# Patient Record
Sex: MALE | Race: BLACK OR AFRICAN AMERICAN
[De-identification: names, ages, dates, MRNs, and addresses within clinical notes are randomized per-mention and may not be internally consistent; named-entity substitution may affect disease eponyms.]

---

## 2017-05-19 NOTE — HP
ADMISSION HISTORY AND PHYSICAL:

 

DATE OF SURGERY:  05/30/17 - [facility]



DATE OF CONSULTATION:  05/18/17.



ATTENDING SURGEON:  Damaso Evans MD (DICTATED BY CYNDI RO)

 

CHIEF COMPLAINT:  Right inguinal swelling and pain.

 

HISTORY OF PRESENT ILLNESS:   is a pleasant 26-year-old gentleman who 
presented to our office earlier in March for evaluation of right inguinal 
hernia. He reports that he had noticed swelling in the right groin area on and 
off for over a year now.  He was seen by his primary care physician 
approximately 6 months ago and was found on examination to have a right 
inguinal hernia for which he was initially referred to our office for 
consultation and possible hernia repair.  He notes that hernia had been present 
intermittently and causes no pain.  He has been able to reduce the hernia 
manually once or twice daily.  He denied any changes in the bowel habits, 
significant pain in the groin area or loss of appetite.  He returned to the 
office today for evaluation and to discuss hernia surgery to be performed by 
Dr. Evans.  Since he was seen here in the office 2 months ago he denies any 
significant changes.  He still notices bulge in the right groin that he is able 
to reduce.  He denies any scrotal swelling, difficulty with bowel movement or 
any other associated symptoms.  He otherwise is relatively healthy 26-year-old 
gentleman who has been diagnosed since childhood with mild mental handicap 
syndrome.

 

PAST MEDICAL HISTORY:  Significant for mild persistent exercises induced asthma
, essential hypertension, familial polycythemia vera, as well as acne.

 

PAST SURGICAL HISTORY:  He had tonsillectomy and adenoidectomy at childhood as 
well as an open heart surgery operation at age 3 that he described as been born 
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

 

CURRENT MEDICATIONS:  Include;

1.  Tylenol 650 mg every 4 hours as needed for pain.

2.  Advair Diskus 100/50 mcg per dose 1 puff by mouth b.i.d.

3.  Benicar 40/25 mg 1 tablet a day.

4.  Aspirin 81 mg once daily.

5.  ProAir  mcg 2 puffs 4 times a day as needed for shortness of breath.

6.  Cepastat 14.5 mg lozenges every 2 hours as needed for sore throat.

7.  Norvasc 5 mg 1 tablet daily.

 

ALLERGIES:  He has no known drug allergies.

 

SOCIAL HISTORY:  The patient is a nonsmoker.  Denies alcohol intake and 
caffeine intake is minimal.  He is active and athletic who works out in the 
[employer] on a daily basis and has been training for the special Olympics.

 

REVIEW OF SYSTEMS:  She HPI, otherwise negative.  He denies any headache, 
dizziness, blurred vision or syncope.  No chest pain, shortness of breath, cough
, or wheezing.  He denies back pain, abdominal pain, nausea,  vomiting, or 
changes in the bowel habits.  He admits to right inguinal hernia for the past 
year or so occasionally present and he has been able to reduce it once or twice 
daily, especially after workout.  Denies any urinary symptoms such as dysuria, 
hematuria, or urinary frequency.

 

                               PHYSICAL EXAMINATION

 

GENERAL:  He is a pleasant, healthy appearing young gentleman in no acute 
distress or discomfort.

 

VITAL SIGNS:  Revealed blood pressure of 132/82, pulse of 76, respiration of 18
, temperature of 97.5, O2 sat of 100% on room air.  He is 5 feet 5-1/2 inches, 
weight is 199 pounds with BMI of 32.

 

HEENT:  Sclerae anicteric.  PERRLA.  EOMs intact.  Oropharynx is pink, moist 
with no exudate.

 

NECK:  Supple.  Trachea midline.  No cervical adenopathy noted.  No thyromegaly.

 

LUNGS:  Clear to auscultation bilaterally.  No rales, wheezes, or rhonchi.

 

HEART:  Regular rate and rhythm.  Normal S1 and S2 without rubs, murmurs, or 
gallops.

 

BACK:  Normal curvature.  No CVA tenderness.

 

ABDOMEN:  Soft, nontender, and nondistended.  A large midline incision noted 
for prior surgery, well healed.  No evidence of incisional hernia.  Examination 
of the right inguinal hernia as the patient is standing revealed moderate size 
right inguinal hernia noted that was nontender on palpation.  There was no 
scrotal swelling or evidence of indirect hernia through the scrotum.  The 
patient was again examined in the supine position and we can appreciate moderate
-size defect in the right inguinal area, again nontender with no evidence of 
incarceration or strangulation.  He is a normal circumcised male.  No scrotal 
swelling.  No penile discharge noted.

 

EXTREMITIES:  Without cyanosis, clubbing, or edema.

 

NEUROLOGIC:  Grossly intact.

 

RECTAL:  Deferred at this time.

 

 IMPRESSION:  A 26-year-old gentleman with right inguinal hernia.

 

PLAN:  The patient is scheduled for an open right inguinal hernia repair by Dr. Evans on 05/30/17.  I went on and discussed with him the rationale and 
indications of surgery today.  The risks of surgery include, but not limited to 
infection, bleeding, or injury to adjacent structures.  He appears to 
understand and consented to go for surgery.  He was asked to hold his aspirin 
for 7 days prior to surgery.  Other information regarding the preoperative and 
immediate postoperative instructions were given to him with emphasis regarding 
weight lifting and exercises limitations.  He seems to understand and wishes to 
proceed as outlined.  He is also advised to take his blood pressure medicine in 
the morning of surgery with a sip of water and he will be seen by his primary 
care physician sometime this week to discuss about any need for medical 
clearance.  His polycythemia vera has been stable and his last therapeutic 
phlebotomy was done last summer.  There is no immediate contraindication for 
surgery at this time.  He will followup him up accordingly after his procedure.

 

 ____________________________________ CYNDI RO



CC:  Christiano Lima MD; Sha Branch MD *

 

[id number]/[facility] #: [id number]

ROSA M

## 2017-05-30 ENCOUNTER — HOSPITAL ENCOUNTER (OUTPATIENT)
Dept: HOSPITAL 25 - OR | Age: 27
Discharge: HOME | End: 2017-05-30
Attending: SURGERY
Payer: MEDICARE

## 2017-05-30 VITALS — SYSTOLIC BLOOD PRESSURE: 126 MMHG | DIASTOLIC BLOOD PRESSURE: 67 MMHG

## 2017-05-30 DIAGNOSIS — I10: ICD-10-CM

## 2017-05-30 DIAGNOSIS — K40.90: Primary | ICD-10-CM

## 2017-05-30 DIAGNOSIS — J45.990: ICD-10-CM

## 2017-05-30 DIAGNOSIS — D45: ICD-10-CM

## 2017-05-30 PROCEDURE — C1781 MESH (IMPLANTABLE): HCPCS

## 2017-05-30 NOTE — OP
OPERATIVE REPORT:

 

DATE OF OPERATION:  05/30/17 - [facility]

 

DATE OF BIRTH:  [birth year]

 

SURGEON:  Damaso Evans MD

 

ASSISTANT:  None.

 

ANESTHESIOLOGIST:  Dr. Dickson.

 

ANESTHESIA:  Local MAC.

 

PRE-OP DIAGNOSIS:  Right inguinal hernia.

 

POST-OP DIAGNOSIS:  Right inguinal hernia.

 

OPERATIVE PROCEDURE:  Open repair, right inguinal hernia with mesh.

 

ESTIMATED BLOOD LOSS:  Minimal.

 

IV FLUIDS:  Crystalloid.

 

SPECIMENS:  None.

 

DRAINS:  None.

 

COMPLICATIONS:  None.

 

COUNTS:  The instrument, needle, and sponge counts were correct.

 

DESCRIPTION OF PROCEDURE:  The patient was brought to the operating room and 
placed on the table supine.  Sequential compression devices were placed on both 
lower extremities.  Intravenous sedation was administered.  The right groin was 
prepped and draped in the usual sterile fashion.  Time-out was performed.

 

Local anesthetic was infiltrated as a field block in the right groin and an 
oblique incision was made approximately 7 cm long.  Subcutaneous tissues were 
divided with cautery.  Crossing veins were divided between clamps and ligated 
with 4 absorbable ties.  The external oblique aponeurosis was identified and 
additional anesthetic was infiltrated beneath this.  It was then opened along 
the line of its fibers through the superficial ring.  The aponeurosis was 
reflected back upon itself revealing the ilioinguinal nerve, which was 
identified, mobilized, and preserved in a lateral position.  The contents of 
the inguinal canal were isolated with a Penrose drain at the level of the pubic 
tubercle.  Subsequently, the patient was noted to have a large indirect 
inguinal hernia sac that was dissected free.  The sac was opened.  There was no 
sliding component.  The sac was twisted upon itself and it was suture ligated 
with 2-0 Polysorb.  The repair was then performed with the polypropylene mesh 
forming a cone-shaped plug, which was placed into the deep space and sutured to 
the overlying musculature with 2-0 Polysorb.  The Onlay patch was then attached 
and this was positioned into the groin to cover the floor.  It was sutured to 
the pubic tubercle, the shelving edge of the inguinal ligament and the conjoint 
tendon with interrupted 2-0 Polysorb.  The tail was cut in the mesh laterally 
to allow the egress of the spermatic cord and the mesh reconstituted laterally 
with a single 2-0 Surgipro suture.  The tails were tucked beneath the external 
oblique aponeurosis.  The ilioinguinal nerve was returned to its anatomic 
position and then the aponeurosis was run closed with a 2-0 Polysorb in a 
running fashion.  3-0 Polysorb was used to close the Ephraim's fascia in an 
interrupted fascia and then skin incisions were closed with 4-0 Monocryl in 
subcuticular fashion.  Steri-Strips were applied.  The patient tolerated the 
procedure well.  He was awakened and transferred to recovery in stable 
condition.

 

CC:  Reyna Lyman*

 

 [id number]/[facility] #: [id number]

MTDTAVARES

## 2018-07-10 ENCOUNTER — HOSPITAL ENCOUNTER (EMERGENCY)
Dept: HOSPITAL 25 - UCEAST | Age: 28
Discharge: HOME | End: 2018-07-10
Payer: MEDICARE

## 2018-07-10 VITALS — DIASTOLIC BLOOD PRESSURE: 57 MMHG | SYSTOLIC BLOOD PRESSURE: 119 MMHG

## 2018-07-10 DIAGNOSIS — I10: ICD-10-CM

## 2018-07-10 DIAGNOSIS — J45.909: ICD-10-CM

## 2018-07-10 DIAGNOSIS — R42: Primary | ICD-10-CM

## 2018-07-10 PROCEDURE — G0463 HOSPITAL OUTPT CLINIC VISIT: HCPCS

## 2018-07-10 PROCEDURE — 93005 ELECTROCARDIOGRAM TRACING: CPT

## 2018-07-10 PROCEDURE — 99211 OFF/OP EST MAY X REQ PHY/QHP: CPT

## 2018-07-10 NOTE — UC
Dizzy HPI


HPI Summary: 


WHILE HAVING A POLYGRAPH TEST DONE THIS MORNING PT SUDDENLY FELT DIZZY. LASTED 

ABOUT A MINUTE THEN SPONTANEOUSLY RESOLVED. PT DENIES CP, SOB, NAUSEA, VISUAL 

DISTURBANCES, SOTO. FEELS WELL AT BASELINE AT TIME OF EXAM. ADMITS HE HASN'T 

EATEN ANYTHING TODAY OR HAD MUCH TO DRINK OTHER THAN A FEW SIPS OF WATER WITH 

HIS 2 BP MEDS THIS MORNING.





- History Of Current Complaint


Chief Complaint: UCDizziness


Stated Complaint: DIZZY


Time Seen by Provider: 07/10/18 11:47


Hx Obtained From: Patient


Onset/Duration: Sudden Onset, Lasting Minutes, Resolved


Severity Initially: Moderate


Severity Currently: None


Pain Intensity: 0


Pain Scale Used: 0-10 Numeric


Character: Dizzy


Aggravating Factor(s): Nothing


Alleviating Factor(s): Other - SPONTANEOUS RESOLUTION


Associated Signs And Symptoms: Negative: Nausea, Tinnitus, Chest Pain, SOB, 

Palpitations, Visual Changes, Change In Medication





- Allergies/Home Medications


Allergies/Adverse Reactions: 


 Allergies











Allergy/AdvReac Type Severity Reaction Status Date / Time


 


No Known Allergies Allergy   Verified 07/10/18 11:48














PMH/Surg Hx/FS Hx/Imm Hx


Cardiovascular History: Hypertension


Respiratory History: Asthma





- Surgical History


Surgical History: Yes


Surgery Procedure, Year, and Place: surgery age 3 for congenital heart anomaly.

  T&A childhood





- Family History


Known Family History: Positive: Hypertension





- Social History


Alcohol Use: Occasionally


Alcohol Amount: 1 drink per week


Substance Use Type: None


Smoking Status (MU): Never Smoked Tobacco


Have You Smoked in the Last Year: No





Review of Systems


Constitutional: Negative


Respiratory: Negative


Cardiovascular: Negative


Gastrointestinal: Negative


Neurological: Other - DIZZINESS - NOW RESOLVED


All Other Systems Reviewed And Are Negative: Yes





Physical Exam


Triage Information Reviewed: Yes


Appearance: Well-Appearing, No Pain Distress, Well-Nourished


Vital Signs: 


 Initial Vital Signs











Temp  97.8 F   07/10/18 11:41


 


Pulse  90   07/10/18 11:41


 


Resp  16   07/10/18 11:41


 


BP  119/57   07/10/18 11:41


 


Pulse Ox  98   07/10/18 11:41











Vital Signs Reviewed: Yes


Eyes: Positive: Conjunctiva Clear


ENT: Positive: Hearing grossly normal, Pharynx normal, TMs normal


Neck: Positive: Supple, Nontender, No Lymphadenopathy


Respiratory Exam: Normal


Cardiovascular Exam: Normal


Abdomen Description: Positive: Soft


Musculoskeletal: Positive: No Edema


Neurological: Positive: Alert, Muscle Tone Normal


Psychological: Positive: Age Appropriate Behavior


Skin: Negative: rashes





Diagnostics





- EKG


Cardiac Rate: NL


Cardiac Rhythm: Sinus: Normal


Ectopy: None





Dizzy Course/Dx





- Course


Course Of Treatment: BRIEF EPISODE OF DIZZINESS LIKELY DUE TO ACUTE STRESSFUL 

SITUATION IN ADDITION TO THE FACT THAT HE TOOK HIS BLOOD PRESSURE MEDICATIONS 

ON AN EMPTY STOMACH AND HAS NOT HAD ANYTHING TO EAT/DRINK SINCE THEN.  WILL 

HAVE PATIENT CHECK HIS BLOOD PRESSURE DAILY FOR THE NEXT COUPLE OF WEEKS AND 

FOLLOW-UP WITH HIS PCP TO ENSURE THAT HE DOES NOT NEED HIS BP MEDS ADJUSTED.  

BLOOD PRESSURE IS LOW NORMAL HERE.  EKG UNREMARKABLE TODAY.  UNCHANGED FROM 

PREVIOUS.





- Differential Dx/Diagnosis


Provider Diagnoses: DIZZINESS - RESOLVED





Discharge





- Sign-Out/Discharge


Documenting (check all that apply): Discharge/Admit/Transfer





- Discharge Plan


Condition: Stable


Disposition: HOME


Patient Education Materials:  Dizziness (ED)


Referrals: 


Christiano Lima MD [Primary Care Provider] -  (FOLLOW-UP IN 2-4 WEEKS)


Additional Instructions: 


YOUR BRIEF EPISODE OF DIZZINESS MAY HAVE BEEN DUE TO THE ACUTE STRESSFUL 

SITUATION IN CONJUNCTION WITH TAKING YOUR BLOOD PRESSURE MEDICATIONS AND NOT 

HAVING ANY SIGNIFICANT FLUID/FOOD INTAKE YET TODAY.  YOUR PHYSICAL EXAM TODAY 

IS NORMAL.  BLOOD PRESSURE LOW NORMAL /57.





WOULD RECOMMEND CHECKING YOUR BLOOD PRESSURE ONCE DAILY IN THE MORNING FOR THE 

NEXT 2 WEEKS.  KEEP A LOG OF THIS INFORMATION AND FOLLOW-UP WITH DR. LIMA IN 

THE NEXT 2-4 WEEKS.  BRING YOUR BLOOD PRESSURE VALUES WITH YOU.  IT'S POSSIBLE 

THAT YOUR MEDICATION REGIMEN MAY NEED TO BE ADJUSTED.  ALSO BE SURE TO DRINK A 

FULL GLASS OF WATER IN THE MORNINGS WHEN YOU TAKE YOUR MEDICATION AND ENSURE 

ADEQUATE HYDRATION OVER THE COURSE OF THE DAY.





GO TO ER WITHOUT FAIL IF YOU DEVELOP RECURRENT DIZZINESS, SHORTNESS OF BREATH, 

CHEST PAIN, NAUSEA, SWEATS OR ANY OTHER CONCERNING SYMPTOMS.





- Billing Disposition and Condition


Condition: STABLE


Disposition: Home

## 2019-09-04 NOTE — HP
CC:  Dr. Lindsey Barton; Dr. Sha Branch*

 

HISTORY AND PHYSICAL:

 

DATE OF PLANNED ADMISSION AND SURGERY:  09/06/19

 

HISTORY OF PRESENT ILLNESS:  Mr. Reg Rizo is a 29-year-old -American 
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 
orchiectomy.  

The patient has noted progressive painless right scrotal enlargement over the 
last 4 months.  It was not been associated with any scrotal or inguinal pain.  
Recently, he noted some heaviness in the Rt hemiscrotum, and on self-
examination he noted enlarged right testis.  He consulted Dr. Barton, who 
ordered a scrotal ultrasound.  The study showed a 6 cm heterogeneous solid mass 
occupying most of the right testis.  The left testis looked normal.  The 
findings were consistent with a large right testicular tumor.

 

The patient was then referred to my office.  Workup of testicular tumor was 
done.  Blood work showed elevated AFP at 61 (normal less than 6) and moderate 
elevation of the HCG at 3.5 (1.4 is the upper limit of normal).  His LDH was 
also elevated.

 

The patient then had a CT of the chest, abdomen, and pelvis.  The study showed 
several intra-aortocaval lymph nodes that were enlarged measuring almost 3 cm 
in size.  There was no evidence of any pulmonary metastasis or other 
abnormalities.

 

The patient is now admitted for right radical orchiectomy.

 

PAST MEDICAL HISTORY AND SYSTEM REVIEW:  The patient is hypertensive, 
maintained on amlodipine 5 mg daily and on irbesartan/HCTZ 300 mg/12.5 mg 
daily.  

He has history of asthma and is on ProAir and on Advair as needed.  

The patient has a history of familial polycythemia vera and is followed by Dr. Branch and is maintained at this time on aspirin 81 mg daily.  He also goes for 
periodic phlebotomies.

 

PAST SURGICAL HISTORY:  Relevant for a right inguinal hernia repair done by Dr. Evans using mesh 2 years ago.

 

ALLERGIES:  He denies any allergies to medications.

 

PERSONAL HISTORY:  He is a nonsmoker.  He has a mild mental handicap and is a 
resident at the [facility].

 

                               PHYSICAL EXAMINATION

 

GENERAL:  A well-built, pleasant black male.

 

VITAL SIGNS:  Blood pressure 140/86, pulse of 80.

 

LUNGS:  Clear.  He has no gynecomastia.

 

HEART:  Regular and rhythmic, no murmurs.

 

ABDOMEN:  Normal.  He has no CVA tenderness.

 

GENITAL:  Exam shows a large firm mass occupying the whole right testis. No 
adherence to the scrotal wall. The left testis feels normal.  He has no 
inguinal hernias.  There is a right inguinal hernia scar, and mesh can be felt 
through the external inguinal ring..

 

EXTREMITIES:  No leg edema.

 

 IMPRESSION:

1.  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 
suspicious for metastatic disease with elevated testicular markers, especially 
elevated AFP.

2.  Status post right inguinal hernia repair with the use of mesh.

3.  Familial thrombocytosis.

4.  Hypertension, controlled on treatment.

5.  Asthma.

 

PLAN/RECOMMENDATIONS:  Plan is for right radical orchiectomy.  The right 
inguinal incision will have to be opened and the mesh may have to be partially 
dissected to allow the dissection of the spermatic cord.  I discussed the 
surgical plan with Dr. Evans, the general surgeon who performed the 
herniorrhaphy, and he will be available to assist in case there is more 
dissection needed at the site of the hernia repair.

 

I had a long discussion with the patient and the [facility] nurse accompanying 
him about the planned surgery.  They both understand that the patient will most 
likely require additional treatments based on the pathology.  Some of the 
potential complications of the surgery including infection, hematoma, and small 
incidence of recurrence of the inguinal hernia were also discussed.  All their 
questions were answered.

 

 

 

[id number]/[facility] #: [id number]

ROSA M

## 2019-09-06 ENCOUNTER — HOSPITAL ENCOUNTER (OUTPATIENT)
Dept: HOSPITAL 25 - OR | Age: 29
Discharge: HOME | End: 2019-09-06
Attending: UROLOGY
Payer: MEDICARE

## 2019-09-06 VITALS — DIASTOLIC BLOOD PRESSURE: 70 MMHG | SYSTOLIC BLOOD PRESSURE: 138 MMHG

## 2019-09-06 DIAGNOSIS — C62.91: Primary | ICD-10-CM

## 2019-09-06 DIAGNOSIS — D47.3: ICD-10-CM

## 2019-09-06 DIAGNOSIS — J45.909: ICD-10-CM

## 2019-09-06 DIAGNOSIS — G40.89: ICD-10-CM

## 2019-09-06 DIAGNOSIS — I10: ICD-10-CM

## 2019-09-06 PROCEDURE — 88333 PATH CONSLTJ SURG CYTO XM 1: CPT

## 2019-09-06 PROCEDURE — 88309 TISSUE EXAM BY PATHOLOGIST: CPT

## 2019-09-06 PROCEDURE — 88342 IMHCHEM/IMCYTCHM 1ST ANTB: CPT

## 2019-09-06 PROCEDURE — 88341 IMHCHEM/IMCYTCHM EA ADD ANTB: CPT

## 2019-09-06 NOTE — OP
CC:  Dr. Branch; Dr. Cardenas *

 

DATE OF OPERATION:  09/06/19 - [facility]

 

DATE OF BIRTH:  [birth year]

 

SURGEON:  Everton Desai MD

 

ASSISTANT:  Joni Almodovar RN, Surgical Assistant.

 

ANESTHESIOLOGIST:  Dr. Saldana.

 

ANESTHESIA:  General.

 

PRE-OP DIAGNOSES:

1.  Right testicular tumor.

2.  Status post right inguinal hernia repair with mesh.

 

POST-OP DIAGNOSES:

1.  Right testicular tumor.

2.  Status post right inguinal hernia repair with mesh.

 

OPERATIVE PROCEDURE:  Right radical orchiectomy.

 

INDICATIONS FOR PROCEDURE:  The patient is a 29-year-old male who noted 
progressive right testicular enlargement and firmness over the last 4 months.  
Scrotal ultrasound showed a 6 cm heterogeneous mass replacing the whole right 
testis.  AFP was markedly elevated.  CT scan of the abdomen and pelvis showed 
suspicious retroperitoneal lymph nodes.  

He had a right inguinal hernia repair 2 years ago with the use of mesh.

He is admitted today for right radical orchiectomy.

 

OPERATIVE FINDINGS:  Exam under anesthesia again confirmed a diffusely enlarged 
and firm right testis.  The left testis felt normal.  There were no adhesions 
between the right testicle and the scrotal skin.

 

Upon right inguinal exploration, there was a dense mesh all the way to the 
level of the external ring.

 

DESCRIPTION OF PROCEDURE:  After successful general anesthesia, the patient was 
prepped and draped for a right scrotal incision.  An incision was carried in 
the right inguinal area and was deepened through the subcutaneous tissue and 
the Ephraim's fascia.  The mesh was exposed.  The spermatic cord was then 
identified at its exit from the external ring and that level was 
circumferentially dissected and a Penrose drain was applied around it.  The 
spermatic cord was then dissected as proximal as possible considering its 
adherence to the mesh.  A Monisha clamp was applied over the spermatic cord at 
that level.

 

The right testicle was then delivered through the inguinal incision. The 
attachments of the testis to the scrotal wall were divided with cautery and the 
vessels were ligated with 2-0 Vicryl ties.  Additional proximal dissection of 
this spermatic cord was then carried as allowable by its adhesions to the mesh. 
The mesh was very dense, and it would have been technically difficult to safely 
dissect the spermatic cord from it.  

Because of the above, and considering the patient has likely metastatic disease 
with elevated AFP, and enlarged retroperitoneal lymph nodes on the CT scan, it 
was decided not to open the mesh to identify the cord for additional dissection 
to the level of the internal ring as customary with a radical orchiectomy.

 

The spermatic cord was then divided between Monisha clamps at its most proximal 
dissection level .  The specimen was removed and sent fresh for pathology.  The 
stump of the spermatic cord was then double ligated with suture ligatures of 2-
0 Vicryl and 2-0 silk ties.  Good hemostasis was achieved.

 

The incision was then irrigated with saline.  After making sure there was good 
hemostasis, a total of 16 cc of 0.5% Marcaine without epinephrine were used to 
infiltrate the incision for postoperative analgesia.  The incision was then 
closed using 2-0 Vicryl for the subcutaneous tissue and the Ephraim's fascia, 
and the skin was closed using running subcuticular suture of 4-0 Vicryl.  Steri-
Strips were applied.

 

The patient tolerated the procedure well and left the operating room in good 
condition.  

There was no blood loss.  All the counts were correct.  

The specimen was right testis and spermatic cord.

 

 [id number]/[facility] #: [id number]

ROSA M

## 2019-10-29 ENCOUNTER — HOSPITAL ENCOUNTER (EMERGENCY)
Dept: HOSPITAL 25 - ED | Age: 29
Discharge: HOME | End: 2019-10-29
Payer: MEDICARE

## 2019-10-29 VITALS — SYSTOLIC BLOOD PRESSURE: 141 MMHG | DIASTOLIC BLOOD PRESSURE: 74 MMHG

## 2019-10-29 DIAGNOSIS — C62.91: ICD-10-CM

## 2019-10-29 DIAGNOSIS — Z79.899: ICD-10-CM

## 2019-10-29 DIAGNOSIS — I10: ICD-10-CM

## 2019-10-29 DIAGNOSIS — T81.89XA: Primary | ICD-10-CM

## 2019-10-29 LAB
ALBUMIN SERPL BCG-MCNC: 4.5 G/DL (ref 3.2–5.2)
ALBUMIN/GLOB SERPL: 1.4 {RATIO} (ref 1–3)
ALP SERPL-CCNC: 81 U/L (ref 34–104)
ALT SERPL W P-5'-P-CCNC: 17 U/L (ref 7–52)
ANION GAP SERPL CALC-SCNC: 7 MMOL/L (ref 2–11)
AST SERPL-CCNC: 16 U/L (ref 13–39)
BASOPHILS # BLD AUTO: 0 10^3/UL (ref 0–0.2)
BUN SERPL-MCNC: 13 MG/DL (ref 6–24)
BUN/CREAT SERPL: 12.9 (ref 8–20)
CALCIUM SERPL-MCNC: 9.6 MG/DL (ref 8.6–10.3)
CHLORIDE SERPL-SCNC: 100 MMOL/L (ref 101–111)
EOSINOPHIL # BLD AUTO: 0 10^3/UL (ref 0–0.6)
GLOBULIN SER CALC-MCNC: 3.3 G/DL (ref 2–4)
GLUCOSE SERPL-MCNC: 87 MG/DL (ref 70–100)
HCO3 SERPL-SCNC: 28 MMOL/L (ref 22–32)
HCT VFR BLD AUTO: 45 % (ref 42–52)
HGB BLD-MCNC: 14.9 G/DL (ref 14–18)
INR PPP/BLD: 1.1 (ref 0.82–1.09)
LYMPHOCYTES # BLD AUTO: 1.1 10^3/UL (ref 1–4.8)
MCH RBC QN AUTO: 26 PG (ref 27–31)
MCHC RBC AUTO-ENTMCNC: 33 G/DL (ref 31–36)
MCV RBC AUTO: 78 FL (ref 80–94)
MONOCYTES # BLD AUTO: 0.3 10^3/UL (ref 0–0.8)
NEUTROPHILS # BLD AUTO: 1.8 10^3/UL (ref 1.5–7.7)
NRBC # BLD AUTO: 0 10^3/UL
NRBC BLD QL AUTO: 0.2
PLATELET # BLD AUTO: 358 10^3/UL (ref 150–450)
POTASSIUM SERPL-SCNC: 3.7 MMOL/L (ref 3.5–5)
PROT SERPL-MCNC: 7.8 G/DL (ref 6.4–8.9)
RBC # BLD AUTO: 5.75 10^6 /UL (ref 4.18–5.48)
SODIUM SERPL-SCNC: 135 MMOL/L (ref 135–145)
WBC # BLD AUTO: 3.3 10^3/UL (ref 3.5–10.8)

## 2019-10-29 PROCEDURE — 85025 COMPLETE CBC W/AUTO DIFF WBC: CPT

## 2019-10-29 PROCEDURE — 85652 RBC SED RATE AUTOMATED: CPT

## 2019-10-29 PROCEDURE — 36415 COLL VENOUS BLD VENIPUNCTURE: CPT

## 2019-10-29 PROCEDURE — 83605 ASSAY OF LACTIC ACID: CPT

## 2019-10-29 PROCEDURE — 86140 C-REACTIVE PROTEIN: CPT

## 2019-10-29 PROCEDURE — 87070 CULTURE OTHR SPECIMN AEROBIC: CPT

## 2019-10-29 PROCEDURE — 99283 EMERGENCY DEPT VISIT LOW MDM: CPT

## 2019-10-29 PROCEDURE — 87205 SMEAR GRAM STAIN: CPT

## 2019-10-29 PROCEDURE — 85610 PROTHROMBIN TIME: CPT

## 2019-10-29 PROCEDURE — 80053 COMPREHEN METABOLIC PANEL: CPT

## 2019-10-29 NOTE — ED
GI/ HPI





- HPI Summary


HPI Summary: 





Pt is a 30 y/o M presenting to the ED with a chief complaint of testicular 

issues. Per report from Dr. Knight at , the pt has stage III testicular 

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 Desai, and the 

wound is not healing. He was seen in the wound clinic yesterday and they were 

unable to do an ultrasound. He notes some drainage since the resection. No 

fevers. Has been packing wound. He denies pain. Dr. Branch is his oncologist, he 

has not started chemotherapy yet.





- History of Current Complaint


Chief Complaint: EDHipPelvisInjury


Time Seen by Provider: 10/29/19 10:30


Stated Complaint: WOUND CHECK AND ULTRA SOUND NEEDED


Hx Obtained From: Patient


Onset/Duration: Started Days Ago, Still Present


Timing: Constant, Lasting Days


Severity: Mild


Current Severity: None


Pain Intensity: 0


Additional Locations for Males: Testicles - right


Associated Signs and Symptoms: Positive: Other: - some drainage from wound


Aggravating Factor(s): Nothing


Alleviating Factor(s): Nothing





- Allergy/Home Medications


Allergies/Adverse Reactions: 


 Allergies











Allergy/AdvReac Type Severity Reaction Status Date / Time


 


No Known Allergies Allergy   Verified 09/06/19 07:20











Home Medications: 


 Home Medications





Acetaminophen TAB* [Tylenol TAB*] 650 mg PO Q4H PRN 10/29/19 [History Confirmed 

10/29/19]


Albuterol inh POWDER (NF) [Proair Respiclick] 2 puff INH QID PRN 10/29/19 [

History Confirmed 10/29/19]


Irbesartan-Hctz 300/12.5 1 tab PO QAM 10/29/19 [History Confirmed 10/29/19]











PMH/Surg Hx/FS Hx/Imm Hx


Previously Healthy: Yes


Endocrine/Hematology History: Reports: Hx Bone Marrow Disease - Polycythemia 

vera, stable


   Denies: Hx Diabetes


Cardiovascular History: Reports: Hx Hypertension - controlled with medication, 

Other Cardiovascular Problems/Disorders - congenital heart anomaly repaired at 

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


Respiratory History: Reports: Hx Asthma - since childhood, controlled with 

medication


GI History: Reports: Other GI Disorders - UMBILICAL HERNIA


 History: Reports: Other  Problems/Disorders - RIGHT TESTE MALIGNANT 

NEOPLASM


   Denies: Hx Renal Disease


Sensory History: Reports: Hx Contacts or Glasses - reading


   Denies: Hx Cataracts, Hx Glaucoma, Hx Hearing Aid


Opthamlomology History: Reports: Hx Contacts or Glasses - reading


   Denies: Hx Cataracts, Hx Glaucoma


Neurological History: Reports: Hx Seizures - last episode was in grade school, 

1997-98?, Other Neuro Impairments/Disorders - gran mal seizure hx, see above





- Surgical History


Surgery Procedure, Year, and Place: surgery age 3 for congenital heart anomaly.

  T&A childhood.  HERNIA SX


Hx Anesthesia Reactions: No


Infectious Disease History: No


Infectious Disease History: 


   Denies: History Other Infectious Disease, Traveled Outside the [place] in Last 30 

Days





- Family History


Known Family History: Positive: Hypertension





- Social History


Alcohol Use: Occasionally


Alcohol Amount: 1 drink per week


Hx Substance Use: No


Substance Use Type: Reports: None


Hx Tobacco Use: No


Smoking Status (MU): Never Smoked Tobacco


Have You Smoked in the Last Year: No





Review of Systems


Positive: other - testicular drainage


Negative: Myalgia - testicular pain


Positive: Other - testicular wound


All Other Systems Reviewed And Are Negative: Yes





Physical Exam





- Summary


Physical Exam Summary: 





Constitutional: Well-developed, Well-nourished, Alert. (-) Distressed


Skin: Warm, Dry. 3cm draining wound (serosanguinous) to R inguinal canal w/o 

surrounding erythema


HENT: Normocephalic; Atraumatic


Eyes: Conjunctiva normal


Neck: Musculoskeletal ROM normal neck. (-) JVD, (-) Stridor, (-) Nuchal rigidity


Cardio: Rhythm regular, rate normal, Heart sounds normal; Intact distal pulses; 

Radial pulses are 2+ and symmetric. (-) Murmur


Pulmonary/Chest wall: Effort normal. (-) Respiratory distress, (-) Wheezes, (-) 

Rales


Abd: Soft, (-) tenderness, (-) Distension, (-) Guarding, (-) Rebound


: Status post right orchiectomy


Musculoskeletal: (-) Edema


Neuro: Alert, Oriented x3


Psych: Mood and affect Normal








Triage Information Reviewed: Yes


Vital Signs On Initial Exam: 


 Initial Vitals











Temp Pulse Resp BP Pulse Ox


 


 97.6 F   73   18   155/77   99 


 


 10/29/19 10:26  10/29/19 10:26  10/29/19 10:26  10/29/19 10:26  10/29/19 10:26











Vital Signs Reviewed: Yes





Procedures





- Sedation


Patient Received Moderate/Deep Sedation with Procedure: No





Diagnostics





- Vital Signs


 Vital Signs











  Temp Pulse Resp BP Pulse Ox


 


 10/29/19 10:35      99


 


 10/29/19 10:26  97.6 F  73  18  155/77  99














- Laboratory


Result Diagrams: 


 10/29/19 10:48





 10/29/19 10:48


Lab Statement: Any lab studies that have been ordered have been reviewed, and 

results considered in the medical decision making process.





- Ultrasound


  ** Soft Tissue US


Ultrasound Interpretation Completed By: Radiologist


Summary of Ultrasound Findings: IN THE AREA OF CLINICAL ABNORMALITY, THERE ARE 

FINDINGS SUSPICIOUS FOR SINUS TRACT EXTENDING TO THE LEVEL OF THE ANTERIOR 

ABDOMINAL WALL MUSCULATURE.  ED physician has reviewed this report.





Re-Evaluation





- Re-Evaluation


  ** First Eval


Re-Evaluation Time: 12:50


Comment: updated patient on results of labs and ultrasound.  Wound was packed.  

Patient to follow-up with Dr. Knight and Dr. Desai outpatient





GIGU Course/Dx





- Course


Course Of Treatment: 30 y/o male w hx R testicular cancer now s/p R orchiectomy 

p/w draining wound.  CT on 10/17 showed post op changes in R inguinal area, 

interaortocaval LB.  PE here w draining wound to R inguinal canal. Will check 

labs and US





- Diagnoses


Provider Diagnoses: 


 Draining postoperative wound








Discharge ED





- Sign-Out/Discharge


Documenting (check all that apply): Patient Departure





- Discharge Plan


Condition: Stable


Disposition: HOME


Patient Education Materials:  Acute Wound Care (ED)


Referrals: 


Roxie Cardenas MD [Primary Care Provider] - 


Everton Desai MD [Medical Doctor] - 


Additional Instructions: 


You were seen in the emergency department for a wound.  Your ultrasound showed 

a small tract on the abdominal wall that is likely the cause of your drainage.  

Please continue to pack the wound with iodoform gauze.  We discussed this with 

our urologist Dr. Desai who agrees to follow up outpatient


If any studies were not completed at the time of discharge you will be called 

with the relevant results.


Please follow up with your primary care doctor in next 2-3 days and return to 

emergency department for worsening or concerning symptoms.


It was a pleasure taking care of you today.





- Billing Disposition and Condition


Condition: STABLE


Disposition: Home





- Attestation Statements


Document Initiated by Scribe: Yes


Documenting Scribe: Danitza Tran


Provider For Whom Scribe is Documenting (Include Credential): Carol Guy MD.


Scribe Attestation: 


I, Danitza Tran, scribed for Carol Guy MD. on 10/29/19 at 1301. 


Scribe Documentation Reviewed: Yes


Provider Attestation: 


The documentation as recorded by the scribe, Danitza Tran accurately 

reflects the service I personally performed and the decisions made by me, 

Carol Guy MD.


Status of Scribe Document: Viewed





Consult


Consult: 





[id number] - I spoke with Dr. Desai about the pt's present condition who requests 

that the wound be packed with iodoform gauze prior to discharge.

## 2019-10-30 ENCOUNTER — HOSPITAL ENCOUNTER (INPATIENT)
Dept: HOSPITAL 25 - MED | Age: 29
LOS: 9 days | Discharge: NURSING, REHAB OR LONG-TERM CARE | DRG: 920 | End: 2019-11-08
Attending: INTERNAL MEDICINE | Admitting: INTERNAL MEDICINE
Payer: MEDICARE

## 2019-10-30 DIAGNOSIS — C62.91: ICD-10-CM

## 2019-10-30 DIAGNOSIS — R59.9: ICD-10-CM

## 2019-10-30 DIAGNOSIS — Z79.82: ICD-10-CM

## 2019-10-30 DIAGNOSIS — I95.9: ICD-10-CM
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
## 2019-10-30 DIAGNOSIS — C62.90: ICD-10-CM

## 2019-10-30 DIAGNOSIS — D45: ICD-10-CM

## 2019-10-30 DIAGNOSIS — E66.9: ICD-10-CM

## 2019-10-30 DIAGNOSIS — R06.6: ICD-10-CM

## 2019-10-30 DIAGNOSIS — Z79.899: ICD-10-CM

## 2019-10-30 DIAGNOSIS — T81.49XA: ICD-10-CM

## 2019-10-30 DIAGNOSIS — J45.909: ICD-10-CM

## 2019-10-30 DIAGNOSIS — I10: ICD-10-CM

## 2019-10-30 DIAGNOSIS — T81.31XA: Primary | ICD-10-CM

## 2019-10-30 DIAGNOSIS — Z79.51: ICD-10-CM

## 2019-10-30 DIAGNOSIS — Z23: ICD-10-CM
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
## 2019-10-30 PROCEDURE — 99232 SBSQ HOSP IP/OBS MODERATE 35: CPT

## 2019-10-30 PROCEDURE — 87070 CULTURE OTHR SPECIMN AEROBIC: CPT

## 2019-10-30 PROCEDURE — G0463 HOSPITAL OUTPT CLINIC VISIT: HCPCS

## 2019-10-30 PROCEDURE — 86140 C-REACTIVE PROTEIN: CPT

## 2019-10-30 PROCEDURE — 70460 CT HEAD/BRAIN W/DYE: CPT

## 2019-10-30 PROCEDURE — 36415 COLL VENOUS BLD VENIPUNCTURE: CPT

## 2019-10-30 PROCEDURE — 85652 RBC SED RATE AUTOMATED: CPT

## 2019-10-30 PROCEDURE — 83605 ASSAY OF LACTIC ACID: CPT

## 2019-10-30 PROCEDURE — 87077 CULTURE AEROBIC IDENTIFY: CPT

## 2019-10-30 PROCEDURE — 99283 EMERGENCY DEPT VISIT LOW MDM: CPT

## 2019-10-30 PROCEDURE — 80048 BASIC METABOLIC PNL TOTAL CA: CPT

## 2019-10-30 PROCEDURE — 90732 PPSV23 VACC 2 YRS+ SUBQ/IM: CPT

## 2019-10-30 PROCEDURE — 80053 COMPREHEN METABOLIC PANEL: CPT

## 2019-10-30 PROCEDURE — 87205 SMEAR GRAM STAIN: CPT

## 2019-10-30 PROCEDURE — 85610 PROTHROMBIN TIME: CPT

## 2019-10-30 PROCEDURE — 94640 AIRWAY INHALATION TREATMENT: CPT

## 2019-10-30 PROCEDURE — 99222 1ST HOSP IP/OBS MODERATE 55: CPT

## 2019-10-30 PROCEDURE — 99214 OFFICE O/P EST MOD 30 MIN: CPT

## 2019-10-30 PROCEDURE — 85025 COMPLETE CBC W/AUTO DIFF WBC: CPT

## 2019-10-30 PROCEDURE — 83735 ASSAY OF MAGNESIUM: CPT

## 2019-10-30 PROCEDURE — 99233 SBSQ HOSP IP/OBS HIGH 50: CPT

## 2019-10-30 RX ADMIN — MOMETASONE FUROATE AND FORMOTEROL FUMARATE DIHYDRATE SCH: 100; 5 AEROSOL RESPIRATORY (INHALATION) at 19:01

## 2019-10-30 RX ADMIN — PIPERACILLIN AND TAZOBACTAM SCH MLS/HR: 3; .375 INJECTION, POWDER, LYOPHILIZED, FOR SOLUTION INTRAVENOUS; PARENTERAL at 21:18

## 2019-10-30 RX ADMIN — ENOXAPARIN SODIUM SCH MG: 40 INJECTION SUBCUTANEOUS at 17:06

## 2019-10-30 RX ADMIN — MOMETASONE FUROATE AND FORMOTEROL FUMARATE DIHYDRATE SCH PUFF: 100; 5 AEROSOL RESPIRATORY (INHALATION) at 18:48

## 2019-10-30 NOTE — CONS
CC:  [facility]; Primary Care Doctor; CYNDI Cheatham; Dr. Everton Desai *

 

SURGICAL CONSULTATION REPORT:

 

DATE OF CONSULT:  10/30/19

 

LOCATION:  Room 401.

 

HISTORY OF PRESENT ILLNESS:  I was asked by my partners to evaluate Mr. Reg Rizo, a 29-year-old gentleman with a new diagnosis of testicular cancer, 
namely seminoma, and orchiectomy done in September 2019.  This was done through 
a right groin incision and the patient's wound was primarily closed.  It opened 
up soon thereafter after the patient showered on postop day 1.  He presented 
back to his surgeon and then was referred to the wound center.  The wound 
center notes were reviewed as well as the patient was evaluated there.

 

The patient is 2-1/2 years status post open right inguinal hernia repair with 
mesh; this was a patch-and-plug for right inguinal hernia.  The patient has 
healed without incident after that procedure.

 

Now, the patient has a nonhealing surgical wound, for which he was admitted and 
started on IV antibiotics.

 

The patient's  states that he has had some significant drainage from 
the wound in the early stages, but in the last day this has settled down.  The 
patient denies any pain.  He describes pruritus at the site.  Originally there 
had been some foul-smelling drainage and these were cultured in the wound 
center on 10/28/19 and showed normal renee.

 

The patient also underwent an ultrasound of the site which was concerning for a 
sinus tract of the open wound to the underlying muscle.

 

Today, the patient states he is doing better with each passing day.  He denies 
any instability.  He has no pain and is urinating okay.

 

PAST MEDICAL HISTORY:  Reviewed and includes hypertension, asthma, polycythemia 
vera.

 

PAST SURGICAL HISTORY:  As above.

 

MEDICATIONS:  List reviewed.

 

ALLERGIES:  No allergies.

 

SOCIAL HISTORY:  He is a nonsmoker.  He lives in assisted living and presents 
with the .

 

REVIEW OF SYSTEMS:  No fevers or chills.  No shortness of breath or chest pain.
  No pain at the incision site.  No disability.  No dysuria.

 

PHYSICAL EXAM:  He is afebrile.  Vital signs are stable.  He is alert and 
oriented x3, in no apparent distress.  Focused examination of the wound reveals 
that he has a 1 x 4.5 cm open incision with a depth of about 1 cm.  Good 
granulation tissue with minimal prominence laterally approximately 0.8 cm and 
medially at 0.8 cm also. No exposed mesh.  The wound is granulating well 
without significant drainage. There is no surrounding cellulitis.

 

DIAGNOSTIC STUDIES/LAB DATA:  Labs reviewed and shows recent white count of 3.3
, H and H 14/42.  CRP is 2.85.

 

IMPRESSION AND RECOMMENDATIONS:  Surgical site infection, now with dehisced 
wound and good granulation tissue, on IV antibiotics at this time.  Concern is 
the possibility of infected mesh underlying it.  This is not apparent on today'
s evaluation and my recommendation is watchful waiting with a negative pressure 
wound therapy treatment at this time to assist in the healing so that the 
patient could be expedited towards his chemotherapy regimen.  The patient is 
aware of this plan and he prefers this.  I feel it is not recommended to excise 
the mesh under these circumstances with a well-healing wound.  We will watch 
closely.

 

The patient cannot go to his living situation with a negative pressure wound 
therapy according to the  who was with him today.  Nevertheless, I 
believe this is important for him going forward to treat his cancer with 
chemotherapy more urgently and for this reason, I would recommend a short-term 
nursing home admission with negative pressure wound therapy I think maybe for 
about 10 to 14 days.  We will follow closely.  No surgical intervention at this 
time. Negative pressure wound therapy and antibiotic IV converted to oral plus/
minus infectious disease consultation.

 

 [id number] #: [id number]

ROSA M

## 2019-10-31 LAB
ANION GAP SERPL CALC-SCNC: 6 MMOL/L (ref 2–11)
BASOPHILS # BLD AUTO: 0 10^3/UL (ref 0–0.2)
BUN SERPL-MCNC: 10 MG/DL (ref 6–24)
BUN/CREAT SERPL: 8.5 (ref 8–20)
CALCIUM SERPL-MCNC: 9.4 MG/DL (ref 8.6–10.3)
CHLORIDE SERPL-SCNC: 103 MMOL/L (ref 101–111)
EOSINOPHIL # BLD AUTO: 0.1 10^3/UL (ref 0–0.6)
GLUCOSE SERPL-MCNC: 107 MG/DL (ref 70–100)
HCO3 SERPL-SCNC: 30 MMOL/L (ref 22–32)
HCT VFR BLD AUTO: 46 % (ref 42–52)
HGB BLD-MCNC: 14.8 G/DL (ref 14–18)
LYMPHOCYTES # BLD AUTO: 0.8 10^3/UL (ref 1–4.8)
MCH RBC QN AUTO: 26 PG (ref 27–31)
MCHC RBC AUTO-ENTMCNC: 32 G/DL (ref 31–36)
MCV RBC AUTO: 80 FL (ref 80–94)
MONOCYTES # BLD AUTO: 0.1 10^3/UL (ref 0–0.8)
NEUTROPHILS # BLD AUTO: 1.5 10^3/UL (ref 1.5–7.7)
NRBC # BLD AUTO: 0 10^3/UL
NRBC BLD QL AUTO: 0.6
PLATELET # BLD AUTO: 322 10^3/UL (ref 150–450)
POTASSIUM SERPL-SCNC: 3.9 MMOL/L (ref 3.5–5)
RBC # BLD AUTO: 5.79 10^6 /UL (ref 4.18–5.48)
SODIUM SERPL-SCNC: 139 MMOL/L (ref 135–145)
WBC # BLD AUTO: 2.5 10^3/UL (ref 3.5–10.8)

## 2019-10-31 RX ADMIN — LOSARTAN POTASSIUM SCH MG: 25 TABLET, FILM COATED ORAL at 08:18

## 2019-10-31 RX ADMIN — ENOXAPARIN SODIUM SCH MG: 40 INJECTION SUBCUTANEOUS at 15:58

## 2019-10-31 RX ADMIN — SODIUM CHLORIDE, SODIUM LACTATE, POTASSIUM CHLORIDE, AND CALCIUM CHLORIDE SCH MLS/HR: 600; 310; 30; 20 INJECTION, SOLUTION INTRAVENOUS at 15:55

## 2019-10-31 RX ADMIN — PIPERACILLIN AND TAZOBACTAM SCH MLS/HR: 3; .375 INJECTION, POWDER, LYOPHILIZED, FOR SOLUTION INTRAVENOUS; PARENTERAL at 14:50

## 2019-10-31 RX ADMIN — AMLODIPINE BESYLATE SCH MG: 5 TABLET ORAL at 08:18

## 2019-10-31 RX ADMIN — HYDROCHLOROTHIAZIDE SCH MG: 25 TABLET ORAL at 08:18

## 2019-10-31 RX ADMIN — ASPIRIN SCH MG: 81 TABLET, COATED ORAL at 08:18

## 2019-10-31 RX ADMIN — PIPERACILLIN AND TAZOBACTAM SCH MLS/HR: 3; .375 INJECTION, POWDER, LYOPHILIZED, FOR SOLUTION INTRAVENOUS; PARENTERAL at 06:00

## 2019-10-31 RX ADMIN — MOMETASONE FUROATE AND FORMOTEROL FUMARATE DIHYDRATE SCH PUFF: 100; 5 AEROSOL RESPIRATORY (INHALATION) at 08:04

## 2019-10-31 RX ADMIN — MOMETASONE FUROATE AND FORMOTEROL FUMARATE DIHYDRATE SCH PUFF: 100; 5 AEROSOL RESPIRATORY (INHALATION) at 20:08

## 2019-10-31 RX ADMIN — PIPERACILLIN AND TAZOBACTAM SCH MLS/HR: 3; .375 INJECTION, POWDER, LYOPHILIZED, FOR SOLUTION INTRAVENOUS; PARENTERAL at 20:35

## 2019-10-31 NOTE — OP
DATE OF OPERATION:  10/31/19 - ROOM #401

 

DATE OF BIRTH:  [birth year]

 

SURGEON:  Tera Mccarthy MD

 

ASSISTANT:  None.

 

PRE-OP DIAGNOSIS:  Right groin infection after orchiectomy, possible mesh 
infection.

 

POST-OP DIAGNOSIS:  Right groin wound, no obvious exposed mesh, wound tracking 
down towards scrotum.

 

OPERATIVE PROCEDURE:  Right groin wound exploration, placement of wound VAC.

 

INDICATIONS FOR PROCEDURE:  The patient with testicular cancer, preparing to 
undergo chemotherapy with apparent active infection of the right groin wound 
after right orchiectomy.  He had a history of right groin hernia repair with 
mesh 2 years ago.  By report, mesh was exposed during the procedure.  He has 
not had closure of the wound from surgery dating back to early September of 2019.  Risks of wound exploration and mesh removal included, but not limited to 
bleeding, infection, injury to pelvic structures including nerves, vessels were 
explained to the patient, who seemed to understand and agreed to the procedure 
and all questions were answered.

 

DESCRIPTION OF PROCEDURE:  The patient was taken to the operating room, placed 
supine.  Preoperative antibiotics had been given.  After the successful 
induction of general endotracheal anesthesia, the right groin was prepped and 
draped in sterile fashion.  I explored the wound initially with my finger, 
which was known to track medially and inferiorly.  This extended well down to 
the groin externally towards the scrotum.  I could not feel any obvious source 
of infection such as exposed mesh or suture.  I opened the wound medially in 
order to achieve better exposure of the nonhealing area.  With retraction, I 
was able to see that this track down towards the scrotum; however, there was no 
obvious nidus of infection, no mesh was exposed.  There was granulation tissue 
on the superficial parts, but none down towards the groin.  There was no 
purulence and again no obvious source of infection.  This was irrigated 
copiously with saline, aspirated dry.  EBL minimal. Hemostasis was intact.  
Decision was made to place a wound VAC down into the entire open area, which 
was performed.  The wound VAC was then covered and sealed and connected to 
continuous suction.  The patient tolerated the procedure well.  He was 
extubated and taken to Recovery in stable condition.

 

 [id number]/CPS #: [id number]

ROSA M

## 2019-10-31 NOTE — PN
Progress Note





- Progress Note


Date of Service: 10/31/19


SOAP: 


Subjective:


feels ok.  wound still draining.  seen by Dr. Pardo last night who recommended 

a more conservative approach of IV antibiotics and negative pressure wound vac 

for hopeful healing in 10-14 days.








Objective:


 Vital Signs











Temp Pulse Resp BP Pulse Ox


 


 97.8 F   66   16   124/66   98 


 


 10/31/19 07:00  10/31/19 08:07  10/31/19 08:07  10/31/19 07:00  10/31/19 08:07








perr eomi


op moist


cta bl


s1 s2 nl


soft nt +bs


no le edema


4 cm open wound with serosanginous drainage, did not probe


A+O x 3, nonfocal neurological exam





 Laboratory Results - last 24 hr











  10/31/19 10/31/19





  08:42 08:42


 


WBC  2.5 L 


 


RBC  5.79 H 


 


Hgb  14.8 


 


Hct  46 


 


MCV  80 


 


MCH  26 L 


 


MCHC  32 


 


RDW  15 


 


Plt Count  322 


 


MPV  7.3 L 


 


Neut % (Auto)  58.6 


 


Lymph % (Auto)  34.1 


 


Mono % (Auto)  4.5 


 


Eos % (Auto)  2.0 


 


Baso % (Auto)  0.8 


 


Absolute Neuts (auto)  1.5 


 


Absolute Lymphs (auto)  0.8 L 


 


Absolute Monos (auto)  0.1 


 


Absolute Eos (auto)  0.1 


 


Absolute Basos (auto)  0.0 


 


Absolute Nucleated RBC  0.0 


 


Nucleated RBC %  0.6 


 


Sodium   139


 


Potassium   3.9


 


Chloride   103


 


Carbon Dioxide   30


 


Anion Gap   6


 


BUN   10


 


Creatinine   1.17


 


Est GFR ( Amer)   89.8


 


Est GFR (Non-Af Amer)   74.2


 


BUN/Creatinine Ratio   8.5


 


Glucose   107 H


 


Calcium   9.4














Acetaminophen (Tylenol Tab*)  650 mg PO Q4H PRN


   PRN Reason: PAIN-MODERATE/TEMP >/= 100.4


Albuterol (Ventolin Hfa Inhaler*)  2 puff INH QID PRN


   PRN Reason: SOB/WHEEZING


Amlodipine Besylate (Norvasc Tab*)  5 mg PO QAM [facility]


   Last Admin: 10/31/19 08:18 Dose:  5 mg


Aspirin (Aspirin Ec Tab*)  81 mg PO DAILY [facility]


   Last Admin: 10/31/19 08:18 Dose:  81 mg


Enoxaparin Sodium (Lovenox(*))  40 mg SUBCUT Q24H [facility]


   Last Admin: 10/30/19 17:06 Dose:  40 mg


Hydrochlorothiazide (Hydrodiuril Tab*)  12.5 mg PO QAM [facility]


   Last Admin: 10/31/19 08:18 Dose:  12.5 mg


Piperacillin Sod/Tazobactam (Sod 3.375 gm/ Sodium Chloride)  100 mls @ 25 mls/

hr IVPB Q8H [facility]


   Last Admin: 10/31/19 06:00 Dose:  25 mls/hr


Losartan Potassium (Cozaar Tab*)  100 mg PO QAM [facility]


   Last Admin: 10/31/19 08:18 Dose:  100 mg


Mometasone Furoate/Formoterol Fumar (Dulera 100/5 Mdi*)  2 puff INH BID [facility]


   Last Admin: 10/31/19 08:04 Dose:  2 puff


Pharmacy Consult (Zosyn Per Pharmacy*)  1 note FOLLOW UP .ZOSYN PER PHARMACY [facility]














Assessment:


28 yo M w recently diagnosed NONseminomatous testicular cancer (based on AFP) 

sp orchiectomy w course complicated by inguinal hernia wound infection and 

dehiscence.  In the interval of waiting for this to heal there has been MARKDED 

progression of his retroperitoneal adenopathy.  He would have ideally started 

chemotherapy several weeks ago and I am very concerned that waiting another 2 

weeks to allow this to hopefully heal with antibiotics and wound vac will 

compromise his health from a cancer standpoint.  I have asked Dr. Mccarthy, who 

previously reviewed the case with ' primary oncologist, Dr. Branch, to 

weigh in on whether surgery would facilitate a faster path to life saving 

chemotherapy. 





Plan:


-cont IV abx


-re-evaluation by surgery today

## 2019-11-01 LAB
ALBUMIN SERPL BCG-MCNC: 3.8 G/DL (ref 3.2–5.2)
ALBUMIN/GLOB SERPL: 1.3 {RATIO} (ref 1–3)
ALP SERPL-CCNC: 73 U/L (ref 34–104)
ALT SERPL W P-5'-P-CCNC: 20 U/L (ref 7–52)
ANION GAP SERPL CALC-SCNC: 8 MMOL/L (ref 2–11)
AST SERPL-CCNC: 18 U/L (ref 13–39)
BASOPHILS # BLD AUTO: 0 10^3/UL (ref 0–0.2)
BUN SERPL-MCNC: 14 MG/DL (ref 6–24)
BUN/CREAT SERPL: 13.7 (ref 8–20)
CALCIUM SERPL-MCNC: 9.5 MG/DL (ref 8.6–10.3)
CHLORIDE SERPL-SCNC: 103 MMOL/L (ref 101–111)
EOSINOPHIL # BLD AUTO: 0 10^3/UL (ref 0–0.6)
GLOBULIN SER CALC-MCNC: 2.9 G/DL (ref 2–4)
GLUCOSE SERPL-MCNC: 99 MG/DL (ref 70–100)
HCO3 SERPL-SCNC: 25 MMOL/L (ref 22–32)
HCT VFR BLD AUTO: 44 % (ref 42–52)
HGB BLD-MCNC: 14.3 G/DL (ref 14–18)
LYMPHOCYTES # BLD AUTO: 0.7 10^3/UL (ref 1–4.8)
MCH RBC QN AUTO: 26 PG (ref 27–31)
MCHC RBC AUTO-ENTMCNC: 32 G/DL (ref 31–36)
MCV RBC AUTO: 79 FL (ref 80–94)
MONOCYTES # BLD AUTO: 0.3 10^3/UL (ref 0–0.8)
NEUTROPHILS # BLD AUTO: 7.8 10^3/UL (ref 1.5–7.7)
NRBC # BLD AUTO: 0 10^3/UL
NRBC BLD QL AUTO: 0
PLATELET # BLD AUTO: 375 10^3/UL (ref 150–450)
POTASSIUM SERPL-SCNC: 4.9 MMOL/L (ref 3.5–5)
PROT SERPL-MCNC: 6.7 G/DL (ref 6.4–8.9)
RBC # BLD AUTO: 5.62 10^6 /UL (ref 4.18–5.48)
SODIUM SERPL-SCNC: 136 MMOL/L (ref 135–145)
WBC # BLD AUTO: 8.7 10^3/UL (ref 3.5–10.8)

## 2019-11-01 PROCEDURE — 3E10X8Z IRRIGATION OF SKIN AND MUCOUS MEMBRANES USING IRRIGATING SUBSTANCE: ICD-10-PCS | Performed by: SURGERY

## 2019-11-01 PROCEDURE — 2W16X6Z COMPRESSION OF RIGHT INGUINAL REGION USING PRESSURE DRESSING: ICD-10-PCS | Performed by: SURGERY

## 2019-11-01 RX ADMIN — PIPERACILLIN AND TAZOBACTAM SCH MLS/HR: 3; .375 INJECTION, POWDER, LYOPHILIZED, FOR SOLUTION INTRAVENOUS; PARENTERAL at 12:24

## 2019-11-01 RX ADMIN — MOMETASONE FUROATE AND FORMOTEROL FUMARATE DIHYDRATE SCH: 100; 5 AEROSOL RESPIRATORY (INHALATION) at 20:28

## 2019-11-01 RX ADMIN — ENOXAPARIN SODIUM SCH MG: 40 INJECTION SUBCUTANEOUS at 16:21

## 2019-11-01 RX ADMIN — PIPERACILLIN AND TAZOBACTAM SCH MLS/HR: 3; .375 INJECTION, POWDER, LYOPHILIZED, FOR SOLUTION INTRAVENOUS; PARENTERAL at 20:28

## 2019-11-01 RX ADMIN — HYDROCHLOROTHIAZIDE SCH MG: 25 TABLET ORAL at 08:53

## 2019-11-01 RX ADMIN — ASPIRIN SCH MG: 81 TABLET, COATED ORAL at 08:52

## 2019-11-01 RX ADMIN — PIPERACILLIN AND TAZOBACTAM SCH MLS/HR: 3; .375 INJECTION, POWDER, LYOPHILIZED, FOR SOLUTION INTRAVENOUS; PARENTERAL at 06:40

## 2019-11-01 RX ADMIN — AMLODIPINE BESYLATE SCH MG: 5 TABLET ORAL at 08:52

## 2019-11-01 RX ADMIN — SODIUM CHLORIDE, SODIUM LACTATE, POTASSIUM CHLORIDE, AND CALCIUM CHLORIDE SCH MLS/HR: 600; 310; 30; 20 INJECTION, SOLUTION INTRAVENOUS at 00:27

## 2019-11-01 RX ADMIN — SODIUM CHLORIDE, SODIUM LACTATE, POTASSIUM CHLORIDE, AND CALCIUM CHLORIDE SCH MLS/HR: 600; 310; 30; 20 INJECTION, SOLUTION INTRAVENOUS at 19:05

## 2019-11-01 RX ADMIN — LOSARTAN POTASSIUM SCH MG: 25 TABLET, FILM COATED ORAL at 08:53

## 2019-11-01 RX ADMIN — MOMETASONE FUROATE AND FORMOTEROL FUMARATE DIHYDRATE SCH PUFF: 100; 5 AEROSOL RESPIRATORY (INHALATION) at 08:21

## 2019-11-01 RX ADMIN — SODIUM CHLORIDE, SODIUM LACTATE, POTASSIUM CHLORIDE, AND CALCIUM CHLORIDE SCH MLS/HR: 600; 310; 30; 20 INJECTION, SOLUTION INTRAVENOUS at 09:50

## 2019-11-01 NOTE — PN
Progress Note





- Progress Note


Date of Service: 11/01/19


SOAP: 


Subjective:


[]Did fine with surgery yesterday. No in pain today. eating well. no new 

complaints. Cannot go back to residence with wound vac. 








Acetaminophen (Tylenol Tab*)  650 mg PO Q4H PRN


   PRN Reason: PAIN-MODERATE/TEMP >/= 100.4


Albuterol (Ventolin Hfa Inhaler*)  2 puff INH QID PRN


   PRN Reason: SOB/WHEEZING


Amlodipine Besylate (Norvasc Tab*)  5 mg PO QAM [facility]


   Last Admin: 10/31/19 08:18 Dose:  5 mg


Aspirin (Aspirin Ec Tab*)  81 mg PO DAILY [facility]


   Last Admin: 10/31/19 08:18 Dose:  81 mg


Enoxaparin Sodium (Lovenox(*))  40 mg SUBCUT Q24H [facility]


   Last Admin: 10/31/19 15:58 Dose:  40 mg


Hydrochlorothiazide (Hydrodiuril Tab*)  12.5 mg PO QAM [facility]


   Last Admin: 10/31/19 08:18 Dose:  12.5 mg


Piperacillin Sod/Tazobactam (Sod 3.375 gm/ Sodium Chloride)  100 mls @ 25 mls/

hr IVPB Q8H [facility]


   Last Admin: 11/01/19 06:40 Dose:  25 mls/hr


Lactated Ringer's (Lactated Ringers 1000 Ml Bag*)  1,000 mls @ 125 mls/hr IV 

PER RATE [facility]


   Last Admin: 11/01/19 00:27 Dose:  125 mls/hr


Losartan Potassium (Cozaar Tab*)  100 mg PO QAM [facility]


   Last Admin: 10/31/19 08:18 Dose:  100 mg


Mometasone Furoate/Formoterol Fumar (Dulera 100/5 Mdi*)  2 puff INH BID [facility]


   Last Admin: 10/31/19 20:08 Dose:  2 puff


Pharmacy Consult (Zosyn Per Pharmacy*)  1 note FOLLOW UP .ZOSYN PER PHARMACY [facility]








Objective:


[]


 Vital Signs











Temp Pulse Resp BP Pulse Ox


 


 98.2 F   81   22   117/51   99 


 


 11/01/19 03:25  11/01/19 03:25  11/01/19 03:25  11/01/19 03:25  11/01/19 03:25








HEENT: om moist, no lesions


CTA


RRR S1S2


+BS mild obese


wound vac in place, bloody drainage


no JUAN C








Assessment:


[]29 year old new diagnosis of testicular ca, non seminoma.  He has stage III, 

node positive disease. Course complicated by wound infection, non healing. Had 

surgery yesterday and mesh no effected. Has wound vac and expect slow healing. 

He will need to proceed with chemotherapy for testicular cancer. He has tashia 

disease, no visceral metastatic disease, low AFP giving him favorable disease, 

he can recieve 4 cycles of EP with GFS. Would like to start next week if 

possible.








Plan:


[]1. Wound vac in place, discussed placement with DS planning. Ideal will be NH 

and chemotherapy should be carve out. Will need transportation. 


2. Given delay re-check tumor markers and check MRI brain;


3. Follow over weekend, can treat on Monday in house if needed.

## 2019-11-01 NOTE — CONS
CONSULTATION REPORT:

 

DATE OF CONSULT:  11/01/19

 

REQUESTING PHYSICIAN:  Dr. Branch.

 

CONSULTING SERVICE:  Infectious Disease.

 

REASON FOR CONSULTATION:  Right inguinal wound.

 

IMPRESSION:

1.  Status post orchiectomy on 09/06/19 followed by reopening of the wound, 
which had been cultured on 10/14/19 and 10/16/19, both time growing citrobacter 
that was pansensitive.  Wound culture on 10/28/19 and 10/29/19 showed normal 
renee.  He does have a history of right inguinal hernia repair with mesh, was 
taken to the operating room by Dr. Mccarthy yesterday for exploration to see if 
the wound tracked down to the mesh.  It was not felt to and so was felt to go 
into the scrotum.  He has a wound VAC now.  There is no abscess in the pelvis 
or scrotum on CT.

2.  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 
upcoming chemotherapy.

3.  Obesity.

 

RECOMMENDATIONS:

1.  Continue Zosyn while he is here to cover citrobacter as well as various 
other possible gram negatives and anaerobes.  I do think with the need for 
upcoming chemotherapy and anticipated neutropenia, he should be maintained on 
oral antibiotics when he is done here and that could be in the form of 
Augmentin 875 mg by mouth twice a day and at this point, I would say the 
duration is indefinite pending his course.

2.  Wound VAC management per Surgery.

 

HISTORY OF PRESENT ILLNESS:  This is a 29-year-old man recently diagnosed with 
right testicular cancer, so far treated with orchiectomy early September, which 
he tolerated well and then said that the incision opened up a couple days 
postoperatively when he was in a shower.  Wound cultures taken on 10/14/19 grew 
citrobacter.  He had been on antibiotics as an outpatient without any change in 
the wound or the serosanguineous drainage he had noticed over time.  He had not 
had a time of fever, chills, or sweats and otherwise felt okay.  He does have 
retroperitoneal adenopathy on his CT imaging and chemotherapy is felt to be 
urgent. Today, he has no fevers or sweats.  He feels well.  He has a VAC 
dressing in right groin after having the wound explored yesterday.  He has been 
on Zosyn since he came here on 

10/ 3019.  His white count on admission was 2.5, it is 8.7 now.  He had a CT 
last done on 10/17/19 which showed no abscess.

 

PAST MEDICAL HISTORY:

1.  Testicular cancer, status post orchiectomy.

2.  History of right inguinal hernia repair with mesh.

3.  Obesity.

4.  Hypertension.

 

MEDICATIONS:

1.  Tylenol.

2.  Albuterol as needed.

3.  Amlodipine.

4.  Aspirin.

5.  Hydrochlorothiazide.

6.  Losartan 100 mg daily.

7.  Zosyn.

 

ALLERGIES:  No known drug allergies.

 

FAMILY HISTORY:  No recurrent infections.

 

SOCIAL HISTORY:  He lives at [facility] in [place].  He has no travel or sick 
contacts.

 

REVIEW OF SYSTEMS:  All negative except as noted above to a 12-point review.

 

PHYSICAL EXAM:  Vital Signs:  Temperature 36.5, heart rate 88, respiratory rate 
14, blood pressure 150/56, oxygen saturation 100% on room air.  General:  He is 
awake, not in distress.  Neurologic:  He is oriented x3, follows all commands.  
HEENT: There is no conjunctival hemorrhage.  Oropharynx:  Without lesions.  
Neck is supple without mass.  Heart is regular rate and rhythm without murmurs, 
rubs, or gallops. Lungs are clear to auscultation bilaterally.  Abdomen:  Soft, 
nontender, nondistended.  There are bowel sounds present.  Skin:  There is no 
rash or splinter hemorrhage.  Genitourinary:  Right inguinal adjacent to 
scrotum incision with a VAC dressing present.  No surrounding erythema or 
tenderness.  Musculoskeletal:  There is no spine tenderness to palpation. 
DIAGNOSTIC STUDIES/LAB DATA:  White blood cell count 8, hemoglobin 14.3, 
platelets 375.  Creatinine 1.

 

Please see impressions and recommendations outlined above.

 

Thanks for asking me to see Mr. Reg Rizo in consultation.

 

 [id number] #: [id number]

ROSA M

## 2019-11-01 NOTE — CONS
*** AMENDED REPORT NOW INCLUDES DATE OF CONSULT ***



CONSULTATION REPORT:



DATE OF CONSULT:  11/01/19

 

HISTORY:  Mr. Reg Rizo is a 29-year-old male who underwent right radical 
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  He developed partial skin 
separation of his incision and there was a concern about infection involving 
mesh that was placed at the time of his right inguinal hernia repair 2 years 
ago.

 

I received a consultation note of Dr. Sadiq Pardo.  In his note, Dr. Pardo 
mentioned that the patient noted that his incision opened up on postoperative 
day #1.  This is not what I have noted on my followup visits.  I had operated 
the patient on 09/06/19.  I saw him for follow-up postoperative visit on 10/03/
19, and the incision looked very well healed, there was no separation, no 
infection.  



His care givers brought him back to my office on 10/16/19. At that time, they 
had just noted a superficial separation of his incision.  On examination at 
that time, the separation was only subcutaneous and there was serous drainage, 
but again no signs of infection and no cellulitis noted.  A wound culture was 
obtained.  I packed the wound with iodoform and recommended daily dressing 
changes and gentle packing with iodoform by the facility or visiting nurse.  He 
was placed on a course of Keflex.  On his followup visit to my office on 10/24/
19, the incision seemed to have opened even deeper into the deep subcutaneous 
tissue.  There was again noted some serous drainage and it looked slightly 
purulent.  Again, I irrigated the incision with Betadine solution and packed 
him with Iodoform packing.  Because the healing was slower than expected, and 
because of the presence of the mesh from his inguinal herniorrhalphy, he was 
then referred to the wound clinic for management.  



The note is to give a better history about the onset of the skin separation of 
the incision, which occurred 4 weeks after the surgery and not on day post-op 
day 1, as reported to Dr. Pardo.



Thank you

 

ORIGINALLY ESIGNED 11/02/19 1048



 [id number]/CPS #: [id number]

ROSA M

## 2019-11-01 NOTE — PN
Progress Note





- Progress Note


Date of Service: 11/01/19


SOAP: 


Subjective: Comfortable in bed in NAD.  + Flatus, + BM, no N/V


[]








Objective:


 Vital Signs











Temp  97.4 F   11/01/19 08:00


 


Pulse  111   11/01/19 08:23


 


Resp  16   11/01/19 08:23


 


BP  136/62   11/01/19 08:00


 


Pulse Ox  95   11/01/19 08:23








 Intake & Output











 10/31/19 11/01/19 11/01/19





 18:59 06:59 18:59


 


Intake Total 2010 145 


 


Balance 2010 145 


 


Intake:   


 


  IV Fluids 1030 45 


 


    LR 1000  


 


    NS (0.9%) 30 45 


 


  IVPB 100 100 


 


    ABX - ZOSYN 100  


 


    NS (0.9%)  100 


 


  Oral 880 0 


 


Other:   


 


  Estimated Void  Large 


 


  # Bowel Movements  0 


 


  # Voids  0 








PEX:


Gen:       NAD


Chest:    CTA


CVS:       RRR


R Groin: Vac Dressing C/D/I


Ext:        calves non tender





[]








Assessment:28 yo male S/P Right Orchiectomy,  Stage III testicular cancer, non 

seminoma, node positive disease. and subsequent wound infection.  Prior Right 

Inguinal Hernia Repair with mesh.  


[]








Plan:  POD 1 S/P Right Groin Wound exploration, Irrigation, and placement of 

Vac Dressing by Dr Mccarthy 10/31/19.  Pt doing well, plan on vac dressing change 

over the weekend by Dr Evans.  Supplies left at bedside.


[]

## 2019-11-02 LAB
ALBUMIN SERPL BCG-MCNC: 3.6 G/DL (ref 3.2–5.2)
ALBUMIN/GLOB SERPL: 1.4 {RATIO} (ref 1–3)
ALP SERPL-CCNC: 55 U/L (ref 34–104)
ALT SERPL W P-5'-P-CCNC: 16 U/L (ref 7–52)
ANION GAP SERPL CALC-SCNC: 5 MMOL/L (ref 2–11)
AST SERPL-CCNC: 11 U/L (ref 13–39)
BASOPHILS # BLD AUTO: 0 10^3/UL (ref 0–0.2)
BUN SERPL-MCNC: 7 MG/DL (ref 6–24)
BUN/CREAT SERPL: 6.9 (ref 8–20)
CALCIUM SERPL-MCNC: 9 MG/DL (ref 8.6–10.3)
CHLORIDE SERPL-SCNC: 105 MMOL/L (ref 101–111)
EOSINOPHIL # BLD AUTO: 0 10^3/UL (ref 0–0.6)
GLOBULIN SER CALC-MCNC: 2.6 G/DL (ref 2–4)
GLUCOSE SERPL-MCNC: 84 MG/DL (ref 70–100)
HCO3 SERPL-SCNC: 29 MMOL/L (ref 22–32)
HCT VFR BLD AUTO: 42 % (ref 42–52)
HGB BLD-MCNC: 13.7 G/DL (ref 14–18)
LYMPHOCYTES # BLD AUTO: 2.1 10^3/UL (ref 1–4.8)
MCH RBC QN AUTO: 26 PG (ref 27–31)
MCHC RBC AUTO-ENTMCNC: 33 G/DL (ref 31–36)
MCV RBC AUTO: 79 FL (ref 80–94)
MONOCYTES # BLD AUTO: 0.4 10^3/UL (ref 0–0.8)
NEUTROPHILS # BLD AUTO: 3.5 10^3/UL (ref 1.5–7.7)
NRBC # BLD AUTO: 0 10^3/UL
NRBC BLD QL AUTO: 0.1
PLATELET # BLD AUTO: 341 10^3/UL (ref 150–450)
POTASSIUM SERPL-SCNC: 3.9 MMOL/L (ref 3.5–5)
PROT SERPL-MCNC: 6.2 G/DL (ref 6.4–8.9)
RBC # BLD AUTO: 5.33 10^6 /UL (ref 4.18–5.48)
SODIUM SERPL-SCNC: 139 MMOL/L (ref 135–145)
WBC # BLD AUTO: 6 10^3/UL (ref 3.5–10.8)

## 2019-11-02 RX ADMIN — MOMETASONE FUROATE AND FORMOTEROL FUMARATE DIHYDRATE SCH PUFF: 100; 5 AEROSOL RESPIRATORY (INHALATION) at 07:46

## 2019-11-02 RX ADMIN — MOMETASONE FUROATE AND FORMOTEROL FUMARATE DIHYDRATE SCH PUFF: 100; 5 AEROSOL RESPIRATORY (INHALATION) at 22:42

## 2019-11-02 RX ADMIN — PIPERACILLIN AND TAZOBACTAM SCH MLS/HR: 3; .375 INJECTION, POWDER, LYOPHILIZED, FOR SOLUTION INTRAVENOUS; PARENTERAL at 13:02

## 2019-11-02 RX ADMIN — LOSARTAN POTASSIUM SCH MG: 25 TABLET, FILM COATED ORAL at 09:30

## 2019-11-02 RX ADMIN — SODIUM CHLORIDE, SODIUM LACTATE, POTASSIUM CHLORIDE, AND CALCIUM CHLORIDE SCH MLS/HR: 600; 310; 30; 20 INJECTION, SOLUTION INTRAVENOUS at 23:40

## 2019-11-02 RX ADMIN — HYDROCHLOROTHIAZIDE SCH MG: 25 TABLET ORAL at 09:30

## 2019-11-02 RX ADMIN — SODIUM CHLORIDE, SODIUM LACTATE, POTASSIUM CHLORIDE, AND CALCIUM CHLORIDE SCH MLS/HR: 600; 310; 30; 20 INJECTION, SOLUTION INTRAVENOUS at 04:56

## 2019-11-02 RX ADMIN — PIPERACILLIN AND TAZOBACTAM SCH MLS/HR: 3; .375 INJECTION, POWDER, LYOPHILIZED, FOR SOLUTION INTRAVENOUS; PARENTERAL at 05:42

## 2019-11-02 RX ADMIN — ENOXAPARIN SODIUM SCH MG: 40 INJECTION SUBCUTANEOUS at 17:14

## 2019-11-02 RX ADMIN — ASPIRIN SCH MG: 81 TABLET, COATED ORAL at 09:32

## 2019-11-02 RX ADMIN — PIPERACILLIN AND TAZOBACTAM SCH MLS/HR: 3; .375 INJECTION, POWDER, LYOPHILIZED, FOR SOLUTION INTRAVENOUS; PARENTERAL at 21:23

## 2019-11-02 RX ADMIN — SODIUM CHLORIDE, SODIUM LACTATE, POTASSIUM CHLORIDE, AND CALCIUM CHLORIDE SCH MLS/HR: 600; 310; 30; 20 INJECTION, SOLUTION INTRAVENOUS at 12:57

## 2019-11-02 RX ADMIN — AMLODIPINE BESYLATE SCH MG: 5 TABLET ORAL at 09:30

## 2019-11-02 NOTE — PN
Progress Note





- Progress Note


Date of Service: 11/02/19


SOAP: 


Subjective:


[Feels good.  No fevers.  Tolerating wound vac well.]








Objective:


[


Vital Signs:











Temp Pulse Resp BP Pulse Ox


 


 98.4 F   71   16   117/51   100 


 


 11/02/19 12:00  11/02/19 12:00  11/02/19 12:00  11/02/19 12:00  11/02/19 12:00

















Acetaminophen (Tylenol Tab*)  650 mg PO Q4H PRN


   PRN Reason: PAIN-MODERATE/TEMP >/= 100.4


Albuterol (Ventolin Hfa Inhaler*)  2 puff INH QID PRN


   PRN Reason: SOB/WHEEZING


Amlodipine Besylate (Norvasc Tab*)  5 mg PO QAM Replaced by [facility]


   Last Admin: 11/02/19 09:30 Dose:  5 mg


Aspirin (Aspirin Ec Tab*)  81 mg PO DAILY Replaced by [facility]


   Last Admin: 11/02/19 09:32 Dose:  81 mg


Enoxaparin Sodium (Lovenox(*))  40 mg SUBCUT Q24H Replaced by [facility]


   Last Admin: 11/01/19 16:21 Dose:  40 mg


Hydrochlorothiazide (Hydrodiuril Tab*)  12.5 mg PO QAM Replaced by [facility]


   Last Admin: 11/02/19 09:30 Dose:  12.5 mg


Piperacillin Sod/Tazobactam (Sod 3.375 gm/ Sodium Chloride)  100 mls @ 25 mls/

hr IVPB Q8H Replaced by [facility]


   Last Admin: 11/02/19 13:02 Dose:  25 mls/hr


Lactated Ringer's (Lactated Ringers 1000 Ml Bag*)  1,000 mls @ 125 mls/hr IV 

PER RATE Replaced by [facility]


   Last Admin: 11/02/19 12:57 Dose:  125 mls/hr


Losartan Potassium (Cozaar Tab*)  100 mg PO QAM Replaced by [facility]


   Last Admin: 11/02/19 09:30 Dose:  100 mg


Mometasone Furoate/Formoterol Fumar (Dulera 100/5 Mdi*)  2 puff INH BID Replaced by [facility]


   Last Admin: 11/02/19 07:46 Dose:  2 puff


Pharmacy Consult (Zosyn Per Pharmacy*)  1 note FOLLOW UP .ZOSYN PER PHARMACY Replaced by [facility]








 Laboratory Results - last 24 hr











  11/02/19 11/02/19





  05:18 05:18


 


WBC  6.0 


 


RBC  5.33 


 


Hgb  13.7 L 


 


Hct  42 


 


MCV  79 L 


 


MCH  26 L 


 


MCHC  33 


 


RDW  16 H 


 


Plt Count  341 


 


MPV  7.7 


 


Neut % (Auto)  58.3 


 


Lymph % (Auto)  34.9 


 


Mono % (Auto)  6.3 


 


Eos % (Auto)  0.2 


 


Baso % (Auto)  0.3 


 


Absolute Neuts (auto)  3.5 


 


Absolute Lymphs (auto)  2.1 


 


Absolute Monos (auto)  0.4 


 


Absolute Eos (auto)  0.0 


 


Absolute Basos (auto)  0.0 


 


Absolute Nucleated RBC  0.0 


 


Nucleated RBC %  0.1 


 


Sodium   139


 


Potassium   3.9


 


Chloride   105


 


Carbon Dioxide   29


 


Anion Gap   5


 


BUN   7


 


Creatinine   1.02


 


Est GFR ( Amer)   105.2


 


Est GFR (Non-Af Amer)   87.0


 


BUN/Creatinine Ratio   6.9 L


 


Glucose   84


 


Calcium   9.0


 


Total Bilirubin   0.30


 


AST   11 L


 


ALT   16


 


Alkaline Phosphatase   55


 


Total Protein   6.2 L


 


Albumin   3.6


 


Globulin   2.6


 


Albumin/Globulin Ratio   1.4








Exam:


Gen: well appearing 30 yo male in NAD


HEENT: MMM


CV: RRR, no m/r/g


Resp: CTA, no w/c/r


Abd: soft, nonTTP, wound vac in place in L inguinal region


Ext: no edema]








Assessment:


[29 year old male with a new diagnosis of testicular ca, non seminoma.  He has 

stage III, node positive disease. Course complicated by wound infection, non 

healing. He was admitted for IV abx and surgical exploration of the wound which 

was done.  The wound does not tunnel to underlying mesh from prior hernia 

repair.  Wound vac in place.





Plan:


[1. R inguinal wound


- Wound vac in place, management per surgical team


- cont Zosyn and transition to Augmentin at discharge with plans to continue 

throughout chemotherapy and/or until wound is completely healed per ID 

recommendations


2. Testicular CA


- plan EP with Neulasta support x 4 cycles to start ASAP, likely start Monday 

depending on discharge plan





Dispo: discussed [facility] for wound vac management v home with his mother.  Patient 

prefers [facility].  


]

## 2019-11-03 LAB
ALBUMIN SERPL BCG-MCNC: 3.8 G/DL (ref 3.2–5.2)
ALBUMIN/GLOB SERPL: 1.4 {RATIO} (ref 1–3)
ALP SERPL-CCNC: 58 U/L (ref 34–104)
ALT SERPL W P-5'-P-CCNC: 15 U/L (ref 7–52)
ANION GAP SERPL CALC-SCNC: 6 MMOL/L (ref 2–11)
AST SERPL-CCNC: 10 U/L (ref 13–39)
BASOPHILS # BLD AUTO: 0 10^3/UL (ref 0–0.2)
BUN SERPL-MCNC: 7 MG/DL (ref 6–24)
BUN/CREAT SERPL: 6.9 (ref 8–20)
CALCIUM SERPL-MCNC: 9.4 MG/DL (ref 8.6–10.3)
CHLORIDE SERPL-SCNC: 100 MMOL/L (ref 101–111)
EOSINOPHIL # BLD AUTO: 0.1 10^3/UL (ref 0–0.6)
GLOBULIN SER CALC-MCNC: 2.8 G/DL (ref 2–4)
GLUCOSE SERPL-MCNC: 81 MG/DL (ref 70–100)
HCO3 SERPL-SCNC: 30 MMOL/L (ref 22–32)
HCT VFR BLD AUTO: 45 % (ref 42–52)
HGB BLD-MCNC: 14.5 G/DL (ref 14–18)
LYMPHOCYTES # BLD AUTO: 1.4 10^3/UL (ref 1–4.8)
MCH RBC QN AUTO: 25 PG (ref 27–31)
MCHC RBC AUTO-ENTMCNC: 32 G/DL (ref 31–36)
MCV RBC AUTO: 79 FL (ref 80–94)
MONOCYTES # BLD AUTO: 0.5 10^3/UL (ref 0–0.8)
NEUTROPHILS # BLD AUTO: 3.7 10^3/UL (ref 1.5–7.7)
NRBC # BLD AUTO: 0 10^3/UL
NRBC BLD QL AUTO: 0.1
PLATELET # BLD AUTO: 309 10^3/UL (ref 150–450)
POTASSIUM SERPL-SCNC: 3.9 MMOL/L (ref 3.5–5)
PROT SERPL-MCNC: 6.6 G/DL (ref 6.4–8.9)
RBC # BLD AUTO: 5.77 10^6 /UL (ref 4.18–5.48)
SODIUM SERPL-SCNC: 136 MMOL/L (ref 135–145)
WBC # BLD AUTO: 5.7 10^3/UL (ref 3.5–10.8)

## 2019-11-03 RX ADMIN — HYDROCHLOROTHIAZIDE SCH MG: 25 TABLET ORAL at 08:07

## 2019-11-03 RX ADMIN — SODIUM CHLORIDE, SODIUM LACTATE, POTASSIUM CHLORIDE, AND CALCIUM CHLORIDE SCH MLS/HR: 600; 310; 30; 20 INJECTION, SOLUTION INTRAVENOUS at 06:55

## 2019-11-03 RX ADMIN — MOMETASONE FUROATE AND FORMOTEROL FUMARATE DIHYDRATE SCH PUFF: 100; 5 AEROSOL RESPIRATORY (INHALATION) at 20:02

## 2019-11-03 RX ADMIN — PIPERACILLIN AND TAZOBACTAM SCH MLS/HR: 3; .375 INJECTION, POWDER, LYOPHILIZED, FOR SOLUTION INTRAVENOUS; PARENTERAL at 21:02

## 2019-11-03 RX ADMIN — PIPERACILLIN AND TAZOBACTAM SCH MLS/HR: 3; .375 INJECTION, POWDER, LYOPHILIZED, FOR SOLUTION INTRAVENOUS; PARENTERAL at 05:21

## 2019-11-03 RX ADMIN — LOSARTAN POTASSIUM SCH MG: 25 TABLET, FILM COATED ORAL at 08:08

## 2019-11-03 RX ADMIN — MOMETASONE FUROATE AND FORMOTEROL FUMARATE DIHYDRATE SCH PUFF: 100; 5 AEROSOL RESPIRATORY (INHALATION) at 07:50

## 2019-11-03 RX ADMIN — PIPERACILLIN AND TAZOBACTAM SCH MLS/HR: 3; .375 INJECTION, POWDER, LYOPHILIZED, FOR SOLUTION INTRAVENOUS; PARENTERAL at 14:44

## 2019-11-03 RX ADMIN — AMLODIPINE BESYLATE SCH MG: 5 TABLET ORAL at 08:07

## 2019-11-03 RX ADMIN — ASPIRIN SCH MG: 81 TABLET, COATED ORAL at 08:07

## 2019-11-03 RX ADMIN — SODIUM CHLORIDE, SODIUM LACTATE, POTASSIUM CHLORIDE, AND CALCIUM CHLORIDE SCH MLS/HR: 600; 310; 30; 20 INJECTION, SOLUTION INTRAVENOUS at 19:33

## 2019-11-03 RX ADMIN — ENOXAPARIN SODIUM SCH MG: 40 INJECTION SUBCUTANEOUS at 18:01

## 2019-11-03 NOTE — PN
Progress Note





- Progress Note


Date of Service: 11/03/19


SOAP: 


Subjective:


[Reports he's doing well.  No acute complaints.  Tolerating wound vac well]








Objective:


[


Vital Signs:











Temp Pulse Resp BP Pulse Ox


 


 98.4 F   89   18   109/55   97 


 


 11/03/19 07:48  11/03/19 07:48  11/03/19 08:00  11/03/19 07:48  11/03/19 08:00














Acetaminophen (Tylenol Tab*)  650 mg PO Q4H PRN


   PRN Reason: PAIN-MODERATE/TEMP >/= 100.4


Albuterol (Ventolin Hfa Inhaler*)  2 puff INH QID PRN


   PRN Reason: SOB/WHEEZING


Amlodipine Besylate (Norvasc Tab*)  5 mg PO QAM [facility]


   Last Admin: 11/03/19 08:07 Dose:  5 mg


Aspirin (Aspirin Ec Tab*)  81 mg PO DAILY [facility]


   Last Admin: 11/03/19 08:07 Dose:  81 mg


Enoxaparin Sodium (Lovenox(*))  40 mg SUBCUT Q24H [facility]


   Last Admin: 11/02/19 17:14 Dose:  40 mg


Hydrochlorothiazide (Hydrodiuril Tab*)  12.5 mg PO QAM [facility]


   Last Admin: 11/03/19 08:07 Dose:  12.5 mg


Piperacillin Sod/Tazobactam (Sod 3.375 gm/ Sodium Chloride)  100 mls @ 25 mls/

hr IVPB Q8H [facility]


   Last Admin: 11/03/19 05:21 Dose:  25 mls/hr


Lactated Ringer's (Lactated Ringers 1000 Ml Bag*)  1,000 mls @ 125 mls/hr IV 

PER RATE [facility]


   Last Admin: 11/03/19 06:55 Dose:  125 mls/hr


Losartan Potassium (Cozaar Tab*)  100 mg PO QAM [facility]


   Last Admin: 11/03/19 08:08 Dose:  100 mg


Mometasone Furoate/Formoterol Fumar (Dulera 100/5 Mdi*)  2 puff INH BID [facility]


   Last Admin: 11/03/19 07:50 Dose:  2 puff


Pharmacy Consult (Zosyn Per Pharmacy*)  1 note FOLLOW UP .ZOSYN PER PHARMACY [facility]





 Laboratory Results - last 24 hr











  11/03/19 11/03/19





  05:20 05:20


 


WBC  5.7 


 


RBC  5.77 H 


 


Hgb  14.5 


 


Hct  45 


 


MCV  79 L 


 


MCH  25 L 


 


MCHC  32 


 


RDW  16 H 


 


Plt Count  309 


 


MPV  7.4 


 


Neut % (Auto)  65.3 


 


Lymph % (Auto)  24.9 


 


Mono % (Auto)  8.1 


 


Eos % (Auto)  0.9 


 


Baso % (Auto)  0.8 


 


Absolute Neuts (auto)  3.7 


 


Absolute Lymphs (auto)  1.4 


 


Absolute Monos (auto)  0.5 


 


Absolute Eos (auto)  0.1 


 


Absolute Basos (auto)  0.0 


 


Absolute Nucleated RBC  0.0 


 


Nucleated RBC %  0.1 


 


Sodium   136


 


Potassium   3.9


 


Chloride   100 L


 


Carbon Dioxide   30


 


Anion Gap   6


 


BUN   7


 


Creatinine   1.01


 


Est GFR ( Amer)   106.4


 


Est GFR (Non-Af Amer)   88.0


 


BUN/Creatinine Ratio   6.9 L


 


Glucose   81


 


Calcium   9.4


 


Total Bilirubin   0.40


 


AST   10 L


 


ALT   15


 


Alkaline Phosphatase   58


 


Total Protein   6.6


 


Albumin   3.8


 


Globulin   2.8


 


Albumin/Globulin Ratio   1.4








Exam:


Gen: well appearing 28 yo male in NAD


HEENT: MMM


CV: RRR, no m/r/g


Resp: CTA, no w/c/r


Abd: soft, nonTTP, wound vac in place in R inguinal region


Ext: no edema]








Assessment:


[29 year old male with a new diagnosis of testicular ca, non seminoma.  He has 

stage III, node positive disease. Course complicated by wound infection, non 

healing. He was admitted for IV abx and surgical exploration of the wound which 

was done.  The wound does not tunnel to underlying mesh from prior hernia 

repair.  Wound vac in place.





Plan:


[1. R inguinal wound


- Wound vac in place, management per surgical team


- cont Zosyn and transition to Augmentin at discharge with plans to continue 

throughout chemotherapy and/or until wound is completely healed per ID 

recommendations


2. Testicular CA


- plan EP with Neulasta support x 4 cycles to start ASAP, likely start Monday 

depending on discharge plan





Dispo: discussed [facility] for wound vac management v home with his mother.  Patient 

prefers [facility].

## 2019-11-04 LAB — MAGNESIUM SERPL-MCNC: 1.7 MG/DL (ref 1.9–2.7)

## 2019-11-04 RX ADMIN — CISPLATIN SCH MLS/HR: 1 INJECTION, SOLUTION INTRAVENOUS at 16:35

## 2019-11-04 RX ADMIN — SODIUM CHLORIDE, SODIUM LACTATE, POTASSIUM CHLORIDE, AND CALCIUM CHLORIDE SCH MLS/HR: 600; 310; 30; 20 INJECTION, SOLUTION INTRAVENOUS at 20:01

## 2019-11-04 RX ADMIN — Medication SCH ML: at 19:11

## 2019-11-04 RX ADMIN — SODIUM CHLORIDE, SODIUM LACTATE, POTASSIUM CHLORIDE, AND CALCIUM CHLORIDE SCH MLS/HR: 600; 310; 30; 20 INJECTION, SOLUTION INTRAVENOUS at 14:42

## 2019-11-04 RX ADMIN — ENOXAPARIN SODIUM SCH MG: 40 INJECTION SUBCUTANEOUS at 19:10

## 2019-11-04 RX ADMIN — MOMETASONE FUROATE AND FORMOTEROL FUMARATE DIHYDRATE SCH PUFF: 100; 5 AEROSOL RESPIRATORY (INHALATION) at 20:10

## 2019-11-04 RX ADMIN — LOSARTAN POTASSIUM SCH MG: 25 TABLET, FILM COATED ORAL at 09:26

## 2019-11-04 RX ADMIN — HYDROCHLOROTHIAZIDE SCH MG: 25 TABLET ORAL at 09:27

## 2019-11-04 RX ADMIN — AMLODIPINE BESYLATE SCH MG: 5 TABLET ORAL at 09:27

## 2019-11-04 RX ADMIN — ASPIRIN SCH MG: 81 TABLET, COATED ORAL at 09:27

## 2019-11-04 RX ADMIN — ETOPOSIDE SCH MLS/HR: 20 INJECTION INTRAVENOUS at 15:30

## 2019-11-04 RX ADMIN — PIPERACILLIN AND TAZOBACTAM SCH MLS/HR: 3; .375 INJECTION, POWDER, LYOPHILIZED, FOR SOLUTION INTRAVENOUS; PARENTERAL at 05:29

## 2019-11-04 RX ADMIN — OLANZAPINE SCH MG: 10 TABLET ORAL at 19:53

## 2019-11-04 RX ADMIN — PIPERACILLIN AND TAZOBACTAM SCH MLS/HR: 3; .375 INJECTION, POWDER, LYOPHILIZED, FOR SOLUTION INTRAVENOUS; PARENTERAL at 19:53

## 2019-11-04 RX ADMIN — MOMETASONE FUROATE AND FORMOTEROL FUMARATE DIHYDRATE SCH PUFF: 100; 5 AEROSOL RESPIRATORY (INHALATION) at 08:24

## 2019-11-04 RX ADMIN — PIPERACILLIN AND TAZOBACTAM SCH MLS/HR: 3; .375 INJECTION, POWDER, LYOPHILIZED, FOR SOLUTION INTRAVENOUS; PARENTERAL at 11:55

## 2019-11-04 RX ADMIN — SODIUM CHLORIDE, SODIUM LACTATE, POTASSIUM CHLORIDE, AND CALCIUM CHLORIDE SCH MLS/HR: 600; 310; 30; 20 INJECTION, SOLUTION INTRAVENOUS at 05:29

## 2019-11-04 NOTE — PN
Progress Note





- Progress Note


Date of Service: 11/04/19


SOAP: 


Subjective:


[Feeling well this morning.  No complaints.  Wound vac dressing was exchanged 

by Dr Evans yesterday.  ]








Objective:


[


Vital Signs:











Temp Pulse Resp BP Pulse Ox


 


 98.4 F   82   18   128/69   97 


 


 11/04/19 08:00  11/04/19 08:26  11/04/19 08:26  11/04/19 08:00  11/04/19 08:26

















Acetaminophen (Tylenol Tab*)  650 mg PO Q4H PRN


   PRN Reason: PAIN-MODERATE/TEMP >/= 100.4


   Last Admin: 11/03/19 14:44 Dose:  650 mg


Albuterol (Ventolin Hfa Inhaler*)  2 puff INH QID PRN


   PRN Reason: SOB/WHEEZING


Amlodipine Besylate (Norvasc Tab*)  5 mg PO QAM [facility]


   Last Admin: 11/04/19 09:27 Dose:  5 mg


Aspirin (Aspirin Ec Tab*)  81 mg PO DAILY [facility]


   Last Admin: 11/04/19 09:27 Dose:  81 mg


Enoxaparin Sodium (Lovenox(*))  40 mg SUBCUT Q24H [facility]


   Last Admin: 11/03/19 18:01 Dose:  40 mg


Hydrochlorothiazide (Hydrodiuril Tab*)  12.5 mg PO QAM [facility]


   Last Admin: 11/04/19 09:27 Dose:  12.5 mg


Piperacillin Sod/Tazobactam (Sod 3.375 gm/ Sodium Chloride)  100 mls @ 25 mls/

hr IVPB Q8H [facility]


   Last Admin: 11/04/19 11:55 Dose:  25 mls/hr


Lactated Ringer's (Lactated Ringers 1000 Ml Bag*)  1,000 mls @ 125 mls/hr IV 

PER RATE [facility]


   Last Admin: 11/04/19 05:29 Dose:  125 mls/hr


Losartan Potassium (Cozaar Tab*)  100 mg PO QAM [facility]


   Last Admin: 11/04/19 09:26 Dose:  100 mg


Mometasone Furoate/Formoterol Fumar (Dulera 100/5 Mdi*)  2 puff INH BID [facility]


   Last Admin: 11/04/19 08:24 Dose:  2 puff


Pharmacy Consult (Zosyn Per Pharmacy*)  1 note FOLLOW UP .ZOSYN PER PHARMACY [facility]





Exam:


Gen: well appearing 28 yo male in NAD


HEENT: MMM


CV: RRR, no m/r/g


Resp: CTA, no w/c/r


Abd: soft, nonTTP, wound vac in place in R inguinal region


Ext: no edema]








Assessment:


[29 year old male with a new diagnosis of testicular ca, non seminoma.  He has 

stage III, node positive disease. Course complicated by wound infection, non 

healing. He was admitted for IV abx and surgical exploration of the wound which 

was done.  The wound does not tunnel to underlying mesh from prior hernia 

repair.  Wound vac in place.





Plan:


[1. R inguinal wound


- Wound vac in place, management per surgical team


- cont Zosyn and transition to Augmentin at discharge with plans to continue 

throughout chemotherapy and/or until wound is completely healed per ID 

recommendations


2. Testicular CA


- plan EP with Neulasta support x 4 cycles to start today:


cisplatin 20 mg/m2 IV D1-5


etoposide 100 mg/m2 IV D1-5 


- start olanzapine 10 mg po qhs D1-5 


- zofran/compazine prn nausea





Dispo: plan for dc to [facility] for wound vac management.

## 2019-11-05 LAB
ALBUMIN SERPL BCG-MCNC: 3.8 G/DL (ref 3.2–5.2)
ALBUMIN/GLOB SERPL: 1.3 {RATIO} (ref 1–3)
ALP SERPL-CCNC: 64 U/L (ref 34–104)
ALT SERPL W P-5'-P-CCNC: 52 U/L (ref 7–52)
ANION GAP SERPL CALC-SCNC: 5 MMOL/L (ref 2–11)
AST SERPL-CCNC: 30 U/L (ref 13–39)
BASOPHILS # BLD AUTO: 0 10^3/UL (ref 0–0.2)
BUN SERPL-MCNC: 15 MG/DL (ref 6–24)
BUN/CREAT SERPL: 14.3 (ref 8–20)
CALCIUM SERPL-MCNC: 9.2 MG/DL (ref 8.6–10.3)
CHLORIDE SERPL-SCNC: 104 MMOL/L (ref 101–111)
EOSINOPHIL # BLD AUTO: 0 10^3/UL (ref 0–0.6)
GLOBULIN SER CALC-MCNC: 2.9 G/DL (ref 2–4)
GLUCOSE SERPL-MCNC: 115 MG/DL (ref 70–100)
HCO3 SERPL-SCNC: 26 MMOL/L (ref 22–32)
HCT VFR BLD AUTO: 43 % (ref 42–52)
HGB BLD-MCNC: 13.7 G/DL (ref 14–18)
LYMPHOCYTES # BLD AUTO: 0.4 10^3/UL (ref 1–4.8)
MAGNESIUM SERPL-MCNC: 1.9 MG/DL (ref 1.9–2.7)
MCH RBC QN AUTO: 25 PG (ref 27–31)
MCHC RBC AUTO-ENTMCNC: 32 G/DL (ref 31–36)
MCV RBC AUTO: 79 FL (ref 80–94)
MONOCYTES # BLD AUTO: 0.1 10^3/UL (ref 0–0.8)
NEUTROPHILS # BLD AUTO: 4 10^3/UL (ref 1.5–7.7)
NRBC # BLD AUTO: 0 10^3/UL
NRBC BLD QL AUTO: 0
PLATELET # BLD AUTO: 337 10^3/UL (ref 150–450)
POTASSIUM SERPL-SCNC: 4.8 MMOL/L (ref 3.5–5)
PROT SERPL-MCNC: 6.7 G/DL (ref 6.4–8.9)
RBC # BLD AUTO: 5.4 10^6 /UL (ref 4.18–5.48)
SODIUM SERPL-SCNC: 135 MMOL/L (ref 135–145)
WBC # BLD AUTO: 4.5 10^3/UL (ref 3.5–10.8)

## 2019-11-05 PROCEDURE — 3E03305 INTRODUCTION OF OTHER ANTINEOPLASTIC INTO PERIPHERAL VEIN, PERCUTANEOUS APPROACH: ICD-10-PCS | Performed by: INTERNAL MEDICINE

## 2019-11-05 RX ADMIN — ASPIRIN SCH MG: 81 TABLET, COATED ORAL at 09:10

## 2019-11-05 RX ADMIN — AMLODIPINE BESYLATE SCH: 5 TABLET ORAL at 10:12

## 2019-11-05 RX ADMIN — ENOXAPARIN SODIUM SCH MG: 40 INJECTION SUBCUTANEOUS at 16:49

## 2019-11-05 RX ADMIN — CISPLATIN SCH MLS/HR: 1 INJECTION, SOLUTION INTRAVENOUS at 15:39

## 2019-11-05 RX ADMIN — SODIUM CHLORIDE, SODIUM LACTATE, POTASSIUM CHLORIDE, AND CALCIUM CHLORIDE SCH MLS/HR: 600; 310; 30; 20 INJECTION, SOLUTION INTRAVENOUS at 13:55

## 2019-11-05 RX ADMIN — PIPERACILLIN AND TAZOBACTAM SCH MLS/HR: 3; .375 INJECTION, POWDER, LYOPHILIZED, FOR SOLUTION INTRAVENOUS; PARENTERAL at 04:40

## 2019-11-05 RX ADMIN — Medication SCH ML: at 04:40

## 2019-11-05 RX ADMIN — DEXAMETHASONE SODIUM PHOSPHATE SCH MLS/HR: 4 INJECTION, SOLUTION INTRAMUSCULAR; INTRAVENOUS at 13:52

## 2019-11-05 RX ADMIN — PIPERACILLIN AND TAZOBACTAM SCH MLS/HR: 3; .375 INJECTION, POWDER, LYOPHILIZED, FOR SOLUTION INTRAVENOUS; PARENTERAL at 21:12

## 2019-11-05 RX ADMIN — MOMETASONE FUROATE AND FORMOTEROL FUMARATE DIHYDRATE SCH PUFF: 100; 5 AEROSOL RESPIRATORY (INHALATION) at 19:26

## 2019-11-05 RX ADMIN — MOMETASONE FUROATE AND FORMOTEROL FUMARATE DIHYDRATE SCH PUFF: 100; 5 AEROSOL RESPIRATORY (INHALATION) at 07:48

## 2019-11-05 RX ADMIN — ETOPOSIDE SCH MLS/HR: 20 INJECTION INTRAVENOUS at 14:24

## 2019-11-05 RX ADMIN — Medication SCH: at 17:38

## 2019-11-05 RX ADMIN — PIPERACILLIN AND TAZOBACTAM SCH MLS/HR: 3; .375 INJECTION, POWDER, LYOPHILIZED, FOR SOLUTION INTRAVENOUS; PARENTERAL at 12:14

## 2019-11-05 RX ADMIN — LOSARTAN POTASSIUM SCH: 25 TABLET, FILM COATED ORAL at 10:13

## 2019-11-05 RX ADMIN — HYDROCHLOROTHIAZIDE SCH: 25 TABLET ORAL at 10:12

## 2019-11-05 RX ADMIN — OLANZAPINE SCH MG: 10 TABLET ORAL at 21:11

## 2019-11-05 NOTE — PN
Progress Note





- Progress Note


Date of Service: 11/05/19


SOAP: 


Subjective:


[]Feeling fine.  No complaints.


No nausea.  Peeing OK.  Normal BM.  Feels like wound vac is helping.


Nursing noted low BP overnight, though pt. asymptomatic.





Medications:


Acetaminophen (Tylenol Tab*)  650 mg PO Q4H PRN


   PRN Reason: PAIN-MODERATE/TEMP >/= 100.4


   Last Admin: 11/03/19 14:44 Dose:  650 mg


Albuterol (Ventolin Hfa Inhaler*)  2 puff INH QID PRN


   PRN Reason: SOB/WHEEZING


Amlodipine Besylate (Norvasc Tab*)  5 mg PO QAM [facility]


   Last Admin: 11/05/19 10:12 Dose:  Not Given


Aspirin (Aspirin Ec Tab*)  81 mg PO DAILY [facility]


   Last Admin: 11/05/19 09:10 Dose:  81 mg


Enoxaparin Sodium (Lovenox(*))  40 mg SUBCUT Q24H [facility]


   Last Admin: 11/04/19 19:10 Dose:  40 mg


Filgrastim-Sndz (Zarxio*)  480 mcg SUBCUT DAILY@0900 [facility]


   Stop: 11/18/19 09:01


Heparin Sodium (Porcine) (Heparin Flush Picc/Ml/Cvc(*))  1 - 3 ml FLUSH 0600,

1800 [facility]; Protocol


   Last Admin: 11/05/19 04:40 Dose:  1 ml


Hydrochlorothiazide (Hydrodiuril Tab*)  12.5 mg PO QAM [facility]


   Last Admin: 11/05/19 10:12 Dose:  Not Given


Piperacillin Sod/Tazobactam (Sod 3.375 gm/ Sodium Chloride)  100 mls @ 25 mls/

hr IVPB Q8H [facility]


   Last Admin: 11/05/19 04:40 Dose:  25 mls/hr


Cisplatin 40 mg/ Sodium (Chloride)  290 mls @ 290 mls/hr IVPB DAILY@1500 [facility]


   Stop: 11/08/19 15:59


   Last Admin: 11/04/19 16:35 Dose:  290 mls/hr


Etoposide 200 mg/ Sodium (Chloride)  510 mls @ 510 mls/hr IVPB DAILY@1400 [facility]


   Stop: 11/08/19 14:59


   Last Admin: 11/04/19 15:30 Dose:  510 mls/hr


Dexamethasone Sodium Phosphate (8 mg/ Sodium Chloride)  52 mls @ 1,560 mls/hr 

IVPB DAILY@1330 [facility]


Palonosetron 0.25 mg/ IV (Solution)  5 mls @ 150 mls/hr IVPB ONCE ONE


   Stop: 11/07/19 13:46


Lactated Ringer's (Lactated Ringers 1000 Ml Bag*)  1,000 mls @ 75 mls/hr IV PER 

RATE [facility]


   Last Admin: 11/04/19 20:01 Dose:  75 mls/hr


Losartan Potassium (Cozaar Tab*)  100 mg PO QAM [facility]


   Last Admin: 11/05/19 10:13 Dose:  Not Given


Mometasone Furoate/Formoterol Fumar (Dulera 100/5 Mdi*)  2 puff INH BID [facility]


   Last Admin: 11/05/19 07:48 Dose:  2 puff


Olanzapine (Zyprexa Tab*)  10 mg PO BEDTIME [facility]


   Stop: 11/08/19 22:00


   Last Admin: 11/04/19 19:53 Dose:  10 mg


Ondansetron HCl (Zofran Odt Tab*)  4 mg SL Q6H PRN


   PRN Reason: NAUSEA/VOMITING


Pharmacy Consult (Zosyn Per Pharmacy*)  1 note FOLLOW UP .ZOSYN PER PHARMACY [facility]


Prochlorperazine (Compazine Tab*)  10 mg PO Q6HR PRN


   PRN Reason: NAUSEA





Objective:


[]


 Vital Signs











Temp Pulse Resp BP Pulse Ox


 


 97.4 F   83   20   126/60   98 


 


 11/05/19 08:00  11/05/19 08:00  11/05/19 08:00  11/05/19 09:01  11/05/19 08:00





A&Ox3, EOMI, neuro grossly non-focal


Limited cognitive ability


HRR, S1S2


LS clear


+BS


Right inguinal wound vac in place, CDI





 Laboratory Results - last 24 hr











  11/03/19 11/05/19 11/05/19





  05:20 04:40 04:40


 


WBC   4.5 


 


RBC   5.40 


 


Hgb   13.7 L 


 


Hct   43 


 


MCV   79 L 


 


MCH   25 L 


 


MCHC   32 


 


RDW   15 


 


Plt Count   337 


 


MPV   7.3 L 


 


Neut % (Auto)   90.1 


 


Lymph % (Auto)   8.3 


 


Mono % (Auto)   1.2 


 


Eos % (Auto)   0.0 


 


Baso % (Auto)   0.4 


 


Absolute Neuts (auto)   4.0 


 


Absolute Lymphs (auto)   0.4 L 


 


Absolute Monos (auto)   0.1 


 


Absolute Eos (auto)   0.0 


 


Absolute Basos (auto)   0.0 


 


Absolute Nucleated RBC   0.0 


 


Nucleated RBC %   0.0 


 


Sodium  136   135


 


Potassium  3.9   4.8


 


Chloride  100 L   104


 


Carbon Dioxide  30   26


 


Anion Gap  6   5


 


BUN  7   15


 


Creatinine  1.01   1.05


 


Est GFR ( Amer)  106.4   101.8


 


Est GFR (Non-Af Amer)  88.0   84.1


 


BUN/Creatinine Ratio  6.9 L   14.3


 


Glucose  81   115 H


 


Calcium  9.4   9.2


 


Magnesium  1.7 L   1.9


 


Total Bilirubin  0.40   0.60


 


AST  10 L   30


 


ALT  15   52


 


Alkaline Phosphatase  58   64


 


Total Protein  6.6   6.7


 


Albumin  3.8   3.8


 


Globulin  2.8   2.9


 


Albumin/Globulin Ratio  1.4   1.3








Assessment:


[]29 year old male with a new diagnosis of testicular ca, non seminoma.  He has 

stage III, node positive disease. Course complicated by non-healing wound with 

subsequent infection admitted for IV abx. and surgical exploration (POD 5) with 

wound vac placement.  He is now on therapy, C1D2 today with minimal side 

effects thus far.





Plan:


[]1. Right inguinal wound


- Wound vac in place, management per surgical team


- cont IV Zosyn and transition to PO Augmentin at discharge with plans to 

continue throughout chemotherapy and/or until wound is completely healed per ID 

recommendations


2. Testicular CA: - now on EP IV [id number], Cycle 1 day 2 today


- cisplatin 20 mg/m2 IV D1-5


- etoposide 100 mg/m2 IV D1-5


- cont. IV fluids during tx., monitor UO, daily labs


- will need weekly labs once outpatient as well


- will require CGSF, D6


- olanzapine 10 mg po qhs D1-5 


- zofran/compazine prn nausea


3. Hypotension: asymptomatic, however on triplet therapy


- hold all 3 this AM and monitor off meds as pt.'s frequently have lower BP on 

chemo


- will d/c hydrochlorothiazide and hold amlodipine and losartan if SBP <100





Dispo: pt. prefers [facility] for wound vac management, pending bed offer, however 

apparently his mother has offered to take him with skilled nursing in the home 

and Jaylen states he would be agreeable to this if he can not get a bed.

## 2019-11-06 RX ADMIN — MOMETASONE FUROATE AND FORMOTEROL FUMARATE DIHYDRATE SCH PUFF: 100; 5 AEROSOL RESPIRATORY (INHALATION) at 07:31

## 2019-11-06 RX ADMIN — OLANZAPINE SCH: 10 TABLET ORAL at 20:40

## 2019-11-06 RX ADMIN — CISPLATIN SCH MLS/HR: 1 INJECTION, SOLUTION INTRAVENOUS at 15:25

## 2019-11-06 RX ADMIN — ETOPOSIDE SCH MLS/HR: 20 INJECTION INTRAVENOUS at 14:18

## 2019-11-06 RX ADMIN — ASPIRIN SCH MG: 81 TABLET, COATED ORAL at 09:09

## 2019-11-06 RX ADMIN — SODIUM CHLORIDE, SODIUM LACTATE, POTASSIUM CHLORIDE, AND CALCIUM CHLORIDE SCH MLS/HR: 600; 310; 30; 20 INJECTION, SOLUTION INTRAVENOUS at 20:40

## 2019-11-06 RX ADMIN — MOMETASONE FUROATE AND FORMOTEROL FUMARATE DIHYDRATE SCH PUFF: 100; 5 AEROSOL RESPIRATORY (INHALATION) at 19:54

## 2019-11-06 RX ADMIN — Medication SCH: at 16:44

## 2019-11-06 RX ADMIN — ENOXAPARIN SODIUM SCH MG: 40 INJECTION SUBCUTANEOUS at 16:41

## 2019-11-06 RX ADMIN — SODIUM CHLORIDE, SODIUM LACTATE, POTASSIUM CHLORIDE, AND CALCIUM CHLORIDE SCH MLS/HR: 600; 310; 30; 20 INJECTION, SOLUTION INTRAVENOUS at 05:30

## 2019-11-06 RX ADMIN — OLANZAPINE SCH MG: 10 TABLET ORAL at 16:40

## 2019-11-06 RX ADMIN — DEXAMETHASONE SODIUM PHOSPHATE SCH MLS/HR: 4 INJECTION, SOLUTION INTRAMUSCULAR; INTRAVENOUS at 13:35

## 2019-11-06 RX ADMIN — PIPERACILLIN AND TAZOBACTAM SCH MLS/HR: 3; .375 INJECTION, POWDER, LYOPHILIZED, FOR SOLUTION INTRAVENOUS; PARENTERAL at 20:39

## 2019-11-06 RX ADMIN — AMLODIPINE BESYLATE SCH MG: 5 TABLET ORAL at 09:09

## 2019-11-06 RX ADMIN — LOSARTAN POTASSIUM SCH MG: 25 TABLET, FILM COATED ORAL at 09:07

## 2019-11-06 RX ADMIN — Medication SCH: at 05:34

## 2019-11-06 RX ADMIN — PIPERACILLIN AND TAZOBACTAM SCH MLS/HR: 3; .375 INJECTION, POWDER, LYOPHILIZED, FOR SOLUTION INTRAVENOUS; PARENTERAL at 05:30

## 2019-11-06 RX ADMIN — PIPERACILLIN AND TAZOBACTAM SCH MLS/HR: 3; .375 INJECTION, POWDER, LYOPHILIZED, FOR SOLUTION INTRAVENOUS; PARENTERAL at 12:21

## 2019-11-06 NOTE — PN
Progress Note





- Progress Note


Date of Service: 11/06/19


SOAP: 


Subjective:


CC: Right inguinal wound infection





HPI:


Mr. Reg Rizo is a 28 yo male with PMH significant for right testicular 

cancer, treated with right orchiectomy in September. Denies fever, chills, 

nausea, vomiting, or diarrhea. He reports that the wound vac is draining 

decreased amount of drainage. 





Objective:


 Vital Signs - 8 hr











  11/06/19





  11:14


 


Temperature 97.4 F


 


Pulse Rate 90


 


Respiratory 16





Rate 


 


Blood Pressure 135/45





(mmHg) 


 


O2 Sat by Pulse 98





Oximetry 








Physical Exam:


General: NAD, laying in bed


Neurological: Alert and Oriented x3


HEENT: Moist MM, no thrush


Cardiovascular: Heart rate regular


Respiratory: Lung sounds clear


Abdominal: Bowel sounds present; ABD soft, non tender and non distended


Skin: No rash. Wound vac intact to the right groin





 Laboratory Last Values











WBC  4.5 10^3/uL (3.5-10.8)   11/05/19  04:40    


 


RBC  5.40 10^6 /uL (4.18-5.48)   11/05/19  04:40    


 


Hgb  13.7 g/dL (14.0-18.0)  L  11/05/19  04:40    


 


Hct  43 % (42-52)   11/05/19  04:40    


 


MCV  79 fL (80-94)  L  11/05/19  04:40    


 


MCH  25 pg (27-31)  L  11/05/19  04:40    


 


MCHC  32 g/dL (31-36)   11/05/19  04:40    


 


RDW  15 % (10-15)   11/05/19  04:40    


 


Plt Count  337 10^3/uL (150-450)   11/05/19  04:40    


 


MPV  7.3 fL (7.4-10.4)  L  11/05/19  04:40    


 


Neut % (Auto)  90.1 %  11/05/19  04:40    


 


Lymph % (Auto)  8.3 %  11/05/19  04:40    


 


Mono % (Auto)  1.2 %  11/05/19  04:40    


 


Eos % (Auto)  0.0 %  11/05/19  04:40    


 


Baso % (Auto)  0.4 %  11/05/19  04:40    


 


Absolute Neuts (auto)  4.0 10^3/ul (1.5-7.7)   11/05/19  04:40    


 


Absolute Lymphs (auto)  0.4 10^3/ul (1.0-4.8)  L  11/05/19  04:40    


 


Absolute Monos (auto)  0.1 10^3/ul (0-0.8)   11/05/19  04:40    


 


Absolute Eos (auto)  0.0 10^3/ul (0-0.6)   11/05/19  04:40    


 


Absolute Basos (auto)  0.0 10^3/ul (0-0.2)   11/05/19  04:40    


 


Absolute Nucleated RBC  0.0 10^3/ul  11/05/19  04:40    


 


Nucleated RBC %  0.0   11/05/19  04:40    


 


Sodium  135 mmol/L (135-145)   11/05/19  04:40    


 


Potassium  4.8 mmol/L (3.5-5.0)   11/05/19  04:40    


 


Chloride  104 mmol/L (101-111)   11/05/19  04:40    


 


Carbon Dioxide  26 mmol/L (22-32)   11/05/19  04:40    


 


Anion Gap  5 mmol/L (2-11)   11/05/19  04:40    


 


BUN  15 mg/dL (6-24)   11/05/19  04:40    


 


Creatinine  1.05 mg/dL (0.67-1.17)   11/05/19  04:40    


 


Est GFR ( Amer)  101.8  (>60)   11/05/19  04:40    


 


Est GFR (Non-Af Amer)  84.1  (>60)   11/05/19  04:40    


 


BUN/Creatinine Ratio  14.3  (8-20)   11/05/19  04:40    


 


Glucose  115 mg/dL ()  H  11/05/19  04:40    


 


Calcium  9.2 mg/dL (8.6-10.3)   11/05/19  04:40    


 


Magnesium  1.9 mg/dL (1.9-2.7)   11/05/19  04:40    


 


Total Bilirubin  0.60 mg/dL (0.2-1.0)   11/05/19  04:40    


 


AST  30 U/L (13-39)   11/05/19  04:40    


 


ALT  52 U/L (7-52)   11/05/19  04:40    


 


Alkaline Phosphatase  64 U/L ()   11/05/19  04:40    


 


Total Protein  6.7 g/dL (6.4-8.9)   11/05/19  04:40    


 


Albumin  3.8 g/dL (3.2-5.2)   11/05/19  04:40    


 


Globulin  2.9 g/dL (2-4)   11/05/19  04:40    


 


Albumin/Globulin Ratio  1.3  (1-3)   11/05/19  04:40    








Assessment:


1. Right inguinal wound s/p orchiectomy. Cultures with citrobacter. No abscess 

on CT scan. Afebrile and no leukocytosis. 


2. 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 Planning 

upcoming chemotherapy.


3. Obesity. BMI 37.8.





Plan:


Continue Zosyn while in the hospital. Will plan to switch to Augmentin 875 mg 

PO BID, indefinite at this time.

## 2019-11-06 NOTE — PN
Progress Note





- Progress Note


Date of Service: 11/06/19


SOAP: 


Subjective:


[]Feeling fine.  No nausea, no mouth sores.  No tingling.  No fatigue.


Eating well.


Worried the wound vac isn't draining.





Medications:


Acetaminophen (Tylenol Tab*)  650 mg PO Q4H PRN


   PRN Reason: PAIN-MODERATE/TEMP >/= 100.4


   Last Admin: 11/03/19 14:44 Dose:  650 mg


Albuterol (Ventolin Hfa Inhaler*)  2 puff INH QID PRN


   PRN Reason: SOB/WHEEZING


Amlodipine Besylate (Norvasc Tab*)  5 mg PO QAM [facility]


   Last Admin: 11/06/19 09:09 Dose:  5 mg


Aspirin (Aspirin Ec Tab*)  81 mg PO DAILY [facility]


   Last Admin: 11/06/19 09:09 Dose:  81 mg


Enoxaparin Sodium (Lovenox(*))  40 mg SUBCUT Q24H [facility]


   Last Admin: 11/05/19 16:49 Dose:  40 mg


Filgrastim-Sndz (Zarxio*)  480 mcg SUBCUT DAILY@0900 [facility]


   Stop: 11/18/19 09:01


Heparin Sodium (Porcine) (Heparin Flush Picc/Ml/Cvc(*))  1 - 3 ml FLUSH 0600,

1800 [facility]; Protocol


   Last Admin: 11/06/19 05:34 Dose:  Not Given


Piperacillin Sod/Tazobactam (Sod 3.375 gm/ Sodium Chloride)  100 mls @ 25 mls/

hr IVPB Q8H [facility]


   Last Admin: 11/06/19 05:30 Dose:  25 mls/hr


Cisplatin 40 mg/ Sodium (Chloride)  290 mls @ 290 mls/hr IVPB DAILY@1500 [facility]


   Stop: 11/08/19 15:59


   Last Admin: 11/05/19 15:39 Dose:  290 mls/hr


Etoposide 200 mg/ Sodium (Chloride)  510 mls @ 510 mls/hr IVPB DAILY@1400 [facility]


   Stop: 11/08/19 14:59


   Last Admin: 11/05/19 14:24 Dose:  510 mls/hr


Dexamethasone Sodium Phosphate (8 mg/ Sodium Chloride)  52 mls @ 1,560 mls/hr 

IVPB DAILY@1330 [facility]


   Last Admin: 11/05/19 13:52 Dose:  1,560 mls/hr


Palonosetron 0.25 mg/ IV (Solution)  5 mls @ 150 mls/hr IVPB ONCE ONE


   Stop: 11/07/19 13:46


Lactated Ringer's (Lactated Ringers 1000 Ml Bag*)  1,000 mls @ 75 mls/hr IV PER 

RATE [facility]


   Last Admin: 11/06/19 05:30 Dose:  75 mls/hr


Losartan Potassium (Cozaar Tab*)  100 mg PO QAM [facility]


   Last Admin: 11/06/19 09:07 Dose:  100 mg


Mometasone Furoate/Formoterol Fumar (Dulera 100/5 Mdi*)  2 puff INH BID [facility]


   Last Admin: 11/06/19 07:31 Dose:  2 puff


Olanzapine (Zyprexa Tab*)  10 mg PO BEDTIME [facility]


   Stop: 11/08/19 22:00


   Last Admin: 11/05/19 21:11 Dose:  10 mg


Ondansetron HCl (Zofran Odt Tab*)  4 mg SL Q6H PRN


   PRN Reason: NAUSEA/VOMITING


Pharmacy Consult (Zosyn Per Pharmacy*)  1 note FOLLOW UP .ZOSYN PER PHARMACY [facility]


Prochlorperazine (Compazine Tab*)  10 mg PO Q6HR PRN


   PRN Reason: NAUSEA





Objective:


[]


 Vital Signs











Temp Pulse Resp BP Pulse Ox


 


 97.5 F   84   16   116/55   99 


 


 11/06/19 08:11  11/06/19 08:11  11/06/19 08:11  11/06/19 08:11  11/06/19 08:11





A&Ox3, EOMI, neuro grossly non-focal


HRR, S1S2


LS clear


+BS, soft and non-tender


Right inguinal wound vac in place, good seal noted








Assessment:


[]29 year old male with recent diagnosis of stage III non-seminoma testicular 

ca with course complicated by non-healing wound and subsequent infection 

admitted for IV abx. and surgical exploration (POD 6) with wound vac in place.  

He is now on therapy, C1D3 today with minimal side effects thus far.





Plan:


[]1. Right inguinal wound


- Wound vac in place, management per surgical team, will be on at least through 

next Wed. 11/13


- cont IV Zosyn and transition to PO Augmentin at discharge with plans to 

continue throughout chemotherapy and/or until wound is completely healed per ID 

recommendations


2. Testicular CA: - now on EP IV [id number], Cycle 1 day 3 today


- cisplatin 20 mg/m2 IV D1-5


- etoposide 100 mg/m2 IV D1-5


- cont. IV fluids during tx., monitor UO, daily labs


- will need weekly labs once outpatient as well


- will require CGSF, D6


- olanzapine 10 mg po qhs D1-5 


- zofran/compazine prn nausea





Dispo: pt. prefers [facility] for wound vac management, pending bed offer, however 

apparently his mother has offered to take him with skilled nursing in the home 

and Jaylen states he would be agreeable to this if he can not get a bed, Case 

management to f/u with mother today.

## 2019-11-06 NOTE — PN
Progress Note





- Progress Note


Date of Service: 11/06/19


SOAP: 


Subjective: Comfortable in bed in NAD.  Vac dressing operating well


[]








Objective:


 Vital Signs











Temp  97.6 F   11/06/19 15:00


 


Pulse  83   11/06/19 15:00


 


Resp  16   11/06/19 15:00


 


BP  147/45   11/06/19 15:00


 


Pulse Ox  99   11/06/19 16:00








 Intake & Output











 11/05/19 11/06/19 11/06/19





 18:59 06:59 18:59


 


Intake Total 1920 1396 2483


 


Output Total 920 1500 750


 


Balance 1000 -104 1733


 


Weight  220 lb 


 


Intake:   


 


  IV Fluids 330 1396 803


 


    Dexamethasone   30


 


     1396 643


 


    NS (0.9%) 30  30


 


    cisplatin   100


 


  IVPB 150  900


 


    ABX - ZOSYN 100  100


 


    Dexamethasone 50  50


 


    cisplatin   750


 


  Oral 1440 0 780


 


Output:   


 


  Urine 920 1500 750


 


Other:   


 


  Date of Last Bowel [id number]  





  Movement   


 


  # Bowel Movements 0 0 


 


  # Voids 2  








PEX:





Gen:      NAD





Right groin wound:  beefy red good granulation tissue, wound decreasing in size


                                    no purulence, no odor.


[]








Assessment:  28 yo male  POD 6 S/P I&D and vac dressing placement of Right 

groin wound, dressing changed 11/2/19  PMH S/P Right Orchiectomy,  Stage III 

testicular cancer, non seminoma, node positive disease. and subsequent wound 

infection.  Prior Right Inguinal Hernia Repair with mesh. 


[]








Plan:  Vac dressing changed By Dr Mccarthy at bedside, pt tolerated well.  change 

vac dressing again in 3 days


[]

## 2019-11-07 RX ADMIN — ETOPOSIDE SCH MLS/HR: 20 INJECTION INTRAVENOUS at 14:02

## 2019-11-07 RX ADMIN — ENOXAPARIN SODIUM SCH MG: 40 INJECTION SUBCUTANEOUS at 17:29

## 2019-11-07 RX ADMIN — CISPLATIN SCH MLS/HR: 1 INJECTION, SOLUTION INTRAVENOUS at 15:08

## 2019-11-07 RX ADMIN — ASPIRIN SCH MG: 81 TABLET, COATED ORAL at 09:05

## 2019-11-07 RX ADMIN — MOMETASONE FUROATE AND FORMOTEROL FUMARATE DIHYDRATE SCH PUFF: 100; 5 AEROSOL RESPIRATORY (INHALATION) at 19:15

## 2019-11-07 RX ADMIN — MOMETASONE FUROATE AND FORMOTEROL FUMARATE DIHYDRATE SCH PUFF: 100; 5 AEROSOL RESPIRATORY (INHALATION) at 07:33

## 2019-11-07 RX ADMIN — DEXAMETHASONE SODIUM PHOSPHATE SCH MLS/HR: 4 INJECTION, SOLUTION INTRAMUSCULAR; INTRAVENOUS at 13:31

## 2019-11-07 RX ADMIN — SODIUM CHLORIDE, SODIUM LACTATE, POTASSIUM CHLORIDE, AND CALCIUM CHLORIDE SCH MLS/HR: 600; 310; 30; 20 INJECTION, SOLUTION INTRAVENOUS at 10:21

## 2019-11-07 RX ADMIN — AMLODIPINE BESYLATE SCH MG: 5 TABLET ORAL at 09:05

## 2019-11-07 RX ADMIN — PIPERACILLIN AND TAZOBACTAM SCH MLS/HR: 3; .375 INJECTION, POWDER, LYOPHILIZED, FOR SOLUTION INTRAVENOUS; PARENTERAL at 13:27

## 2019-11-07 RX ADMIN — PIPERACILLIN AND TAZOBACTAM SCH MLS/HR: 3; .375 INJECTION, POWDER, LYOPHILIZED, FOR SOLUTION INTRAVENOUS; PARENTERAL at 04:14

## 2019-11-07 RX ADMIN — OLANZAPINE SCH MG: 10 TABLET ORAL at 20:25

## 2019-11-07 RX ADMIN — Medication SCH: at 17:15

## 2019-11-07 RX ADMIN — LOSARTAN POTASSIUM SCH MG: 25 TABLET, FILM COATED ORAL at 09:05

## 2019-11-07 RX ADMIN — PIPERACILLIN AND TAZOBACTAM SCH MLS/HR: 3; .375 INJECTION, POWDER, LYOPHILIZED, FOR SOLUTION INTRAVENOUS; PARENTERAL at 20:25

## 2019-11-07 RX ADMIN — Medication SCH: at 04:16

## 2019-11-07 NOTE — PN
Progress Note





- Progress Note


Date of Service: 11/07/19


SOAP: 


Subjective:


[]Feeling well.  Had hiccups yesterday that resolved after a couple doses of 

compazine and olanzipine in PM.


Denies nausea.  No mouth sores.  Eating well.


Normal BMs and good UO.


Worried his mom won't be home enough to help him with wound vac.





Medications


Acetaminophen (Tylenol Tab*)  650 mg PO Q4H PRN


   PRN Reason: PAIN-MODERATE/TEMP >/= 100.4


   Last Admin: 11/03/19 14:44 Dose:  650 mg


Albuterol (Ventolin Hfa Inhaler*)  2 puff INH QID PRN


   PRN Reason: SOB/WHEEZING


Amlodipine Besylate (Norvasc Tab*)  5 mg PO QAM [facility]


   Last Admin: 11/07/19 09:05 Dose:  5 mg


Aspirin (Aspirin Ec Tab*)  81 mg PO DAILY [facility]


   Last Admin: 11/07/19 09:05 Dose:  81 mg


Enoxaparin Sodium (Lovenox(*))  40 mg SUBCUT Q24H [facility]


   Last Admin: 11/06/19 16:41 Dose:  40 mg


Filgrastim-Sndz (Zarxio*)  480 mcg SUBCUT DAILY@0900 [facility]


   Stop: 11/18/19 09:01


Heparin Sodium (Porcine) (Heparin Flush Picc/Ml/Cvc(*))  1 - 3 ml FLUSH 0600,

1800 [facility]; Protocol


   Last Admin: 11/07/19 04:16 Dose:  Not Given


Piperacillin Sod/Tazobactam (Sod 3.375 gm/ Sodium Chloride)  100 mls @ 25 mls/

hr IVPB Q8H [facility]


   Last Admin: 11/07/19 04:14 Dose:  25 mls/hr


Cisplatin 40 mg/ Sodium (Chloride)  290 mls @ 290 mls/hr IVPB DAILY@1500 [facility]


   Stop: 11/08/19 15:59


   Last Admin: 11/06/19 15:25 Dose:  290 mls/hr


Etoposide 200 mg/ Sodium (Chloride)  510 mls @ 510 mls/hr IVPB DAILY@1400 [facility]


   Stop: 11/08/19 14:59


   Last Admin: 11/06/19 14:18 Dose:  510 mls/hr


Dexamethasone Sodium Phosphate (8 mg/ Sodium Chloride)  52 mls @ 1,560 mls/hr 

IVPB DAILY@1330 [facility]


   Last Admin: 11/06/19 13:35 Dose:  1,560 mls/hr


Palonosetron 0.25 mg/ IV (Solution)  5 mls @ 150 mls/hr IVPB ONCE ONE


   Stop: 11/07/19 13:46


Lactated Ringer's (Lactated Ringers 1000 Ml Bag*)  1,000 mls @ 75 mls/hr IV PER 

RATE [facility]


   Last Admin: 11/06/19 20:40 Dose:  75 mls/hr


Losartan Potassium (Cozaar Tab*)  100 mg PO QAM [facility]


   Last Admin: 11/07/19 09:05 Dose:  100 mg


Mometasone Furoate/Formoterol Fumar (Dulera 100/5 Mdi*)  2 puff INH BID [facility]


   Last Admin: 11/07/19 07:33 Dose:  2 puff


Olanzapine (Zyprexa Tab*)  10 mg PO BEDTIME [facility]


   Stop: 11/08/19 22:00


   Last Admin: 11/06/19 20:40 Dose:  Not Given


Ondansetron HCl (Zofran Odt Tab*)  4 mg SL Q6H PRN


   PRN Reason: NAUSEA/VOMITING


Pharmacy Consult (Zosyn Per Pharmacy*)  1 note FOLLOW UP .ZOSYN PER PHARMACY [facility]


Prochlorperazine (Compazine Tab*)  10 mg PO Q6HR PRN


   PRN Reason: NAUSEA


   Last Admin: 11/06/19 14:44 Dose:  10 mg





Objective:


[]


 Vital Signs











Temp Pulse Resp BP Pulse Ox


 


 97.4 F   83   14   115/54   96 


 


 11/07/19 07:24  11/07/19 07:35  11/07/19 07:35  11/07/19 07:24  11/07/19 07:35











A&Ox3, EOMI, neuro grossly non-focal


MMM, No mouth sores


HRR, S1S2


LS clear bilat.


+BS


clear, yellow urine


No edema


Right inguinal wound vac dressing CDI





Assessment:


[]29 year old male with recent diagnosis of stage III non-seminoma testicular 

ca with course complicated by non-healing wound and subsequent infection 

admitted for IV abx. and surgical exploration (POD 7) with wound vac in place.  

He is now on therapy, C1D4 EP with minimal side effects thus far.





Plan:


[]1. Right inguinal wound


- Wound vac in place, management per surgical team, will be on at least through 

next Wed. 11/13


- cont IV Zosyn while inpt. and transition to PO Augmentin 875 mg BID at 

discharge with plans to continue throughout chemotherapy and/or until wound is 

completely healed as per ID


2. Testicular CA: - now on EP IV [id number], Cycle 1 day 4 today


- cisplatin 20 mg/m2 IV D1-5


- etoposide 100 mg/m2 IV D1-5


- cont. IV fluids during tx., monitor UO, daily labs


- will need weekly labs once outpatient as well


- will require Neupogen 480 mcg sq daily starting Day6


- olanzapine 10 mg po qhs D1-5


- zofran/compazine prn nausea





Dispo: skilled need for wound vac management and daily injections, call placed 

to mother Lisa Rizo to assess ability to care for him at home, if not safe 

plan then recommend Swing starting Friday afternoon after completion of chemo

## 2019-11-08 ENCOUNTER — HOSPITAL ENCOUNTER (INPATIENT)
Dept: HOSPITAL 25 - MED | Age: 29
LOS: 8 days | Discharge: HOME | DRG: 863 | End: 2019-11-16
Attending: INTERNAL MEDICINE | Admitting: HOSPITALIST
Payer: MEDICARE

## 2019-11-08 VITALS — DIASTOLIC BLOOD PRESSURE: 41 MMHG | SYSTOLIC BLOOD PRESSURE: 127 MMHG

## 2019-11-08 DIAGNOSIS — T81.43XA: Primary | ICD-10-CM

## 2019-11-08 DIAGNOSIS — L02.214: ICD-10-CM

## 2019-11-08 DIAGNOSIS — Z79.899: ICD-10-CM

## 2019-11-08 DIAGNOSIS — Z79.82: ICD-10-CM

## 2019-11-08 DIAGNOSIS — C62.91: ICD-10-CM

## 2019-11-08 DIAGNOSIS — I10: ICD-10-CM

## 2019-11-08 DIAGNOSIS — Z79.1: ICD-10-CM

## 2019-11-08 DIAGNOSIS — J45.909: ICD-10-CM

## 2019-11-08 LAB
ALBUMIN SERPL BCG-MCNC: 3.4 G/DL (ref 3.2–5.2)
ALBUMIN/GLOB SERPL: 1.4 {RATIO} (ref 1–3)
ALP SERPL-CCNC: 49 U/L (ref 34–104)
ALT SERPL W P-5'-P-CCNC: 40 U/L (ref 7–52)
ANION GAP SERPL CALC-SCNC: 7 MMOL/L (ref 2–11)
AST SERPL-CCNC: 15 U/L (ref 13–39)
BASOPHILS # BLD AUTO: 0 10^3/UL (ref 0–0.2)
BUN SERPL-MCNC: 17 MG/DL (ref 6–24)
BUN/CREAT SERPL: 18.5 (ref 8–20)
CALCIUM SERPL-MCNC: 9 MG/DL (ref 8.6–10.3)
CHLORIDE SERPL-SCNC: 106 MMOL/L (ref 101–111)
EOSINOPHIL # BLD AUTO: 0 10^3/UL (ref 0–0.6)
GLOBULIN SER CALC-MCNC: 2.5 G/DL (ref 2–4)
GLUCOSE SERPL-MCNC: 76 MG/DL (ref 70–100)
HCO3 SERPL-SCNC: 25 MMOL/L (ref 22–32)
HCT VFR BLD AUTO: 43 % (ref 42–52)
HGB BLD-MCNC: 13.1 G/DL (ref 14–18)
LYMPHOCYTES # BLD AUTO: 0.6 10^3/UL (ref 1–4.8)
MAGNESIUM SERPL-MCNC: 1.8 MG/DL (ref 1.9–2.7)
MCH RBC QN AUTO: 26 PG (ref 27–31)
MCHC RBC AUTO-ENTMCNC: 31 G/DL (ref 31–36)
MCV RBC AUTO: 84 FL (ref 80–94)
MONOCYTES # BLD AUTO: 0 10^3/UL (ref 0–0.8)
NEUTROPHILS # BLD AUTO: 2.2 10^3/UL (ref 1.5–7.7)
NRBC # BLD AUTO: 0 10^3/UL
NRBC BLD QL AUTO: 0.1
PLATELET # BLD AUTO: 306 10^3/UL (ref 150–450)
POTASSIUM SERPL-SCNC: 4.3 MMOL/L (ref 3.5–5)
PROT SERPL-MCNC: 5.9 G/DL (ref 6.4–8.9)
RBC # BLD AUTO: 5.12 10^6 /UL (ref 4.18–5.48)
SODIUM SERPL-SCNC: 138 MMOL/L (ref 135–145)
WBC # BLD AUTO: 2.8 10^3/UL (ref 3.5–10.8)

## 2019-11-08 PROCEDURE — 85060 BLOOD SMEAR INTERPRETATION: CPT

## 2019-11-08 PROCEDURE — 99239 HOSP IP/OBS DSCHRG MGMT >30: CPT

## 2019-11-08 PROCEDURE — 85025 COMPLETE CBC W/AUTO DIFF WBC: CPT

## 2019-11-08 PROCEDURE — 80053 COMPREHEN METABOLIC PANEL: CPT

## 2019-11-08 PROCEDURE — 94640 AIRWAY INHALATION TREATMENT: CPT

## 2019-11-08 PROCEDURE — 99232 SBSQ HOSP IP/OBS MODERATE 35: CPT

## 2019-11-08 PROCEDURE — 36415 COLL VENOUS BLD VENIPUNCTURE: CPT

## 2019-11-08 PROCEDURE — 85027 COMPLETE CBC AUTOMATED: CPT

## 2019-11-08 RX ADMIN — ENOXAPARIN SODIUM SCH MG: 40 INJECTION SUBCUTANEOUS at 17:31

## 2019-11-08 RX ADMIN — Medication SCH: at 04:34

## 2019-11-08 RX ADMIN — CISPLATIN SCH MLS/HR: 1 INJECTION, SOLUTION INTRAVENOUS at 16:05

## 2019-11-08 RX ADMIN — ENOXAPARIN SODIUM SCH: 40 INJECTION SUBCUTANEOUS at 20:44

## 2019-11-08 RX ADMIN — Medication SCH: at 17:49

## 2019-11-08 RX ADMIN — AMOXICILLIN AND CLAVULANATE POTASSIUM SCH MG: 500; 125 TABLET, FILM COATED ORAL at 20:44

## 2019-11-08 RX ADMIN — PROCHLORPERAZINE MALEATE PRN MG: 10 TABLET, FILM COATED ORAL at 20:44

## 2019-11-08 RX ADMIN — LOSARTAN POTASSIUM SCH MG: 25 TABLET, FILM COATED ORAL at 08:32

## 2019-11-08 RX ADMIN — PIPERACILLIN AND TAZOBACTAM SCH MLS/HR: 3; .375 INJECTION, POWDER, LYOPHILIZED, FOR SOLUTION INTRAVENOUS; PARENTERAL at 04:50

## 2019-11-08 RX ADMIN — AMLODIPINE BESYLATE SCH MG: 5 TABLET ORAL at 08:33

## 2019-11-08 RX ADMIN — DEXAMETHASONE SODIUM PHOSPHATE SCH MLS/HR: 4 INJECTION, SOLUTION INTRAMUSCULAR; INTRAVENOUS at 14:11

## 2019-11-08 RX ADMIN — ETOPOSIDE SCH MLS/HR: 20 INJECTION INTRAVENOUS at 14:46

## 2019-11-08 RX ADMIN — MOMETASONE FUROATE AND FORMOTEROL FUMARATE DIHYDRATE SCH PUFF: 100; 5 AEROSOL RESPIRATORY (INHALATION) at 07:45

## 2019-11-08 RX ADMIN — ASPIRIN SCH MG: 81 TABLET, COATED ORAL at 08:33

## 2019-11-08 NOTE — PN
Progress Note





- Progress Note


Date of Service: 11/08/19


SOAP: 


Subjective:


CC: R groin wound


HPI: 29 year old man with recent orchiectomy for testicular cancer, no healing 

groin wound which was explored here and now has a vacc dressing.  He started 

chemotherapy here and is tolerating it well.  He has no fever, rash, or 

diarrhea. 








Objective:





 Vital Signs











Temp  36.4 C   11/08/19 07:12


 


Pulse  64   11/08/19 07:46


 


Resp  18   11/08/19 08:00


 


BP  132/56   11/08/19 07:12


 


Pulse Ox  99   11/08/19 08:00








 Intake & Output











 11/07/19 11/08/19 11/08/19





 18:59 06:59 18:59


 


Intake Total 1467.6 498 240


 


Output Total 2050  


 


Balance -582.4 498 240


 


Weight  222 lb 1.6 oz 


 


Intake:   


 


  IV Fluids 917.6 398 


 


    ABX - ZOSYN 440  


 


    Aprepitant 5  


 


    Dexamethasone 52  


 


    LR  378 


 


    NS (0.9%) 140.6 20 


 


    cisplatin 280  


 


  IVPB  100 


 


    ABX - ZOSYN  100 


 


  Oral 550 0 240


 


Output:   


 


  Urine 2050  


 


Other:   


 


  Estimated Void Medium  


 


  # Voids 1 1 








Gen:awake, no distress


HEENT: no thrush


Heart:RRR no murmur


Lungs:CTA BL


Abd:+BS NTND soft, R groin vac


Skin: no rash


 Laboratory Results - last 24 hr











  11/08/19 11/08/19





  05:20 05:20


 


WBC  2.8 L 


 


RBC  5.12 


 


Hgb  13.1 L 


 


Hct  43 


 


MCV  84 


 


MCH  26 L 


 


MCHC  31 


 


RDW  17 H 


 


Plt Count  306 


 


MPV  7.7 


 


Neut % (Auto)  78.2 


 


Lymph % (Auto)  20.7 


 


Mono % (Auto)  0.9 


 


Eos % (Auto)  0.1 


 


Baso % (Auto)  0.1 


 


Absolute Neuts (auto)  2.2 


 


Absolute Lymphs (auto)  0.6 L 


 


Absolute Monos (auto)  0.0 


 


Absolute Eos (auto)  0.0 


 


Absolute Basos (auto)  0.0 


 


Absolute Nucleated RBC  0.0 


 


Nucleated RBC %  0.1 


 


Sodium   138


 


Potassium   4.3


 


Chloride   106


 


Carbon Dioxide   25


 


Anion Gap   7


 


BUN   17


 


Creatinine   0.92


 


Est GFR ( Amer)   118.5


 


Est GFR (Non-Af Amer)   98.0


 


BUN/Creatinine Ratio   18.5


 


Glucose   76


 


Calcium   9.0


 


Magnesium   1.8 L


 


Total Bilirubin   0.30


 


AST   15


 


ALT   40


 


Alkaline Phosphatase   49


 


Total Protein   5.9 L


 


Albumin   3.4


 


Globulin   2.5


 


Albumin/Globulin Ratio   1.4














Assessment:


1. R groin wound, vac per surgery team


2. chemotherapy


3. Obesity








Plan:


1. change zosyn to augmentin with open wound on chemotherapy with potential for 

neutropenia


Discussed with Fabrice HANNA

## 2019-11-08 NOTE — PN
Progress Note





- Progress Note


Date of Service: 11/08/19


SOAP: 


Subjective:


[Doing well.  Reports some fatigue.  No n/v/d/c.]








Objective:


[


Vital Signs:











Temp Pulse Resp BP Pulse Ox


 


 97.5 F   64   18   132/56   99 


 


 11/08/19 07:12  11/08/19 07:46  11/08/19 08:00  11/08/19 07:12  11/08/19 08:00











 Laboratory Results - last 24 hr











  11/08/19 11/08/19





  05:20 05:20


 


WBC  2.8 L 


 


RBC  5.12 


 


Hgb  13.1 L 


 


Hct  43 


 


MCV  84 


 


MCH  26 L 


 


MCHC  31 


 


RDW  17 H 


 


Plt Count  306 


 


MPV  7.7 


 


Neut % (Auto)  78.2 


 


Lymph % (Auto)  20.7 


 


Mono % (Auto)  0.9 


 


Eos % (Auto)  0.1 


 


Baso % (Auto)  0.1 


 


Absolute Neuts (auto)  2.2 


 


Absolute Lymphs (auto)  0.6 L 


 


Absolute Monos (auto)  0.0 


 


Absolute Eos (auto)  0.0 


 


Absolute Basos (auto)  0.0 


 


Absolute Nucleated RBC  0.0 


 


Nucleated RBC %  0.1 


 


Sodium   138


 


Potassium   4.3


 


Chloride   106


 


Carbon Dioxide   25


 


Anion Gap   7


 


BUN   17


 


Creatinine   0.92


 


Est GFR ( Amer)   118.5


 


Est GFR (Non-Af Amer)   98.0


 


BUN/Creatinine Ratio   18.5


 


Glucose   76


 


Calcium   9.0


 


Magnesium   1.8 L


 


Total Bilirubin   0.30


 


AST   15


 


ALT   40


 


Alkaline Phosphatase   49


 


Total Protein   5.9 L


 


Albumin   3.4


 


Globulin   2.5


 


Albumin/Globulin Ratio   1.4

















Acetaminophen (Tylenol Tab*)  650 mg PO Q4H PRN


   PRN Reason: PAIN-MODERATE/TEMP >/= 100.4


   Last Admin: 11/03/19 14:44 Dose:  650 mg


Albuterol (Ventolin Hfa Inhaler*)  2 puff INH QID PRN


   PRN Reason: SOB/WHEEZING


Amlodipine Besylate (Norvasc Tab*)  5 mg PO QAM [facility]


   Last Admin: 11/08/19 08:33 Dose:  5 mg


Amoxicillin/Clavulanate Potassium (Augmentin Tab*)  500 mg PO BID [facility]


Aspirin (Aspirin Ec Tab*)  81 mg PO DAILY [facility]


   Last Admin: 11/08/19 08:33 Dose:  81 mg


Enoxaparin Sodium (Lovenox(*))  40 mg SUBCUT Q24H [facility]


   Last Admin: 11/07/19 17:29 Dose:  40 mg


Filgrastim-Sndz (Zarxio*)  480 mcg SUBCUT DAILY@0900 [facility]


   Stop: 11/18/19 09:01


Heparin Sodium (Porcine) (Heparin Flush Picc/Ml/Cvc(*))  1 - 3 ml FLUSH 0600,

1800 [facility]; Protocol


   Last Admin: 11/08/19 04:34 Dose:  Not Given


Cisplatin 40 mg/ Sodium (Chloride)  290 mls @ 290 mls/hr IVPB DAILY@1500 [facility]


   Stop: 11/08/19 15:59


   Last Admin: 11/07/19 15:08 Dose:  290 mls/hr


Etoposide 200 mg/ Sodium (Chloride)  510 mls @ 510 mls/hr IVPB DAILY@1400 [facility]


   Stop: 11/08/19 14:59


   Last Admin: 11/07/19 14:02 Dose:  510 mls/hr


Dexamethasone Sodium Phosphate (8 mg/ Sodium Chloride)  52 mls @ 1,560 mls/hr 

IVPB DAILY@1330 [facility]


   Last Admin: 11/07/19 13:31 Dose:  1,560 mls/hr


Lactated Ringer's (Lactated Ringers 1000 Ml Bag*)  1,000 mls @ 75 mls/hr IV PER 

RATE [facility]


   Last Admin: 11/07/19 10:21 Dose:  75 mls/hr


Losartan Potassium (Cozaar Tab*)  100 mg PO QAM [facility]


   Last Admin: 11/08/19 08:32 Dose:  100 mg


Mometasone Furoate/Formoterol Fumar (Dulera 100/5 Mdi*)  2 puff INH BID [facility]


   Last Admin: 11/08/19 07:45 Dose:  2 puff


Olanzapine (Zyprexa Tab*)  10 mg PO BEDTIME [facility]


   Stop: 11/08/19 22:00


   Last Admin: 11/07/19 20:25 Dose:  10 mg


Ondansetron HCl (Zofran Odt Tab*)  4 mg SL Q6H PRN


   PRN Reason: NAUSEA/VOMITING


Prochlorperazine (Compazine Tab*)  10 mg PO Q6HR PRN


   PRN Reason: Nausea or hiccups


   Last Admin: 11/08/19 09:30 Dose:  10 mg





Exam:


A&Ox3, EOMI, neuro grossly non-focal


MMM, No mouth sores


HRR, S1S2


LS clear bilat.


+BS


clear, yellow urine


No edema


Right inguinal wound vac dressing CDI





Assessment:


[]29 year old male with recent diagnosis of stage III non-seminoma testicular 

ca with course complicated by non-healing wound and subsequent infection 

admitted for IV abx. and surgical exploration (POD 7) with wound vac in place.  

He is now on therapy, C1D4 EP with minimal side effects thus far.





Plan:


[]1. Right inguinal wound


- Wound vac in place, management per surgical team, will be on at least through 

next Wed. 11/13


- dc IV Zosyn today transition to PO Augmentin 875 mg BID with plans to 

continue throughout chemotherapy and/or until wound is completely healed as per 

ID


2. Testicular CA: - now on EP IV [id number], Cycle 1 day 5 today


- cisplatin 20 mg/m2 IV D1-5


- etoposide 100 mg/m2 IV D1-5


- cont. IV fluids during tx., monitor UO, daily labs


- will need weekly labs once outpatient as well


- will require Neupogen 480 mcg sq daily starting Day6 (tomorrow)


- olanzapine 10 mg po qhs D1-5


- zofran/compazine prn nausea


3. Hiccups


- cont compazine prn





Dispo: due to legal constraints of terms of probation, pt is unable to return 

home with his mother at this time.  Plan to transition to swing status this 

afternoon after completion of today's chemotherapy.  DC to [facility] when 

wound vac is removed.


\

## 2019-11-08 NOTE — DS
CC:  Dr. Roxie Cardenas; Dr. Mccarthy *

 

DISCHARGE SUMMARY/ADMISSION HISTORY AND PHYSICAL:

 

DATE OF ADMISSION:  10/30/19

 

DATE OF DISCHARGE:  Discharge from acute care status and admission to [facility] 
status, 11/08/19.

 

PRIMARY CARE PROVIDER:  Dr. Roxie Cardenas.

 

CONSULTING GENERAL SURGEON:  Dr. Mccarthy.

 

PRIMARY ONCOLOGIST AND ATTENDING PHYSICIAN:  Dr. Sha Branch.* (DICTATED BY 
CYNDI CHOWDHURY)

 

PRIMARY DIAGNOSES:

1.  Nonhealing right inguinal wound, status post right orchiectomy with 
associated infection.

2.  Testicular cancer.

 

CURRENT MEDICATIONS:

1.  Acetaminophen 650 mg p.o. q.6 hours as needed for pain or fever.

2.  Albuterol 2 puffs inhaled 4 times daily as needed for shortness of breath.

3.  Amlodipine 5 mg p.o. daily.

4.  Augmentin 500 mg p.o. twice daily.

5.  Aspirin 81 mg daily.

6.  Enoxaparin 40 mg subcu daily.

7.  Filgrastim 480 mcg subcu daily, days 6 through 15 of chemotherapy cycle.

8.  Losartan 100 mg p.o. daily.

9.  Symbicort 2 puffs inhaled twice daily.

10.  Zofran 4 mg sublingual q.6 hours as needed for nausea.

11.  Compazine 10 mg p.o. q.6 hours as needed for nausea or hiccups.

 

HOSPITAL IMAGING:  CT brain shows no acute pathology.

 

HOSPITAL COURSE/HISTORY OF PRESENT ILLNESS:  This is a 29-year-old gentleman 
with stage III nonseminoma testicular cancer, who underwent orchiectomy, whose 
surgical wound initially healed, but then opened again with purulent drainage, 
which was cultured multiple times growing a pansensitive citrobacter.  The 
patient was treated as an outpatient with a course of Augmentin followed by a 
course of Keflex, but the wound continued to open further.  His plans to 
initiate chemotherapy were delayed multiple times due to his nonhealing wound 
and the patient was subsequently admitted to the hospital with concerns that 
the mesh underlying the right inguinal wound that was placed for a prior 
inguinal hernia repair was infected and he may require a more dramatic 
debridement procedure.  The patient was taken to the operating room with Dr. Mccarthy on 10/31/19 and underwent surgical exploration. There was no large 
abscess seen and the wound did not tunnel to the mesh and it was therefore felt 
that the mesh was not involved.  A wound VAC was subsequently placed.  The 
patient was treated with IV Zosyn.

 

The patient was started on chemotherapy during this hospital stay, received his 
first cycle of etoposide and cisplatin, receiving cisplatin 20 mg per meter 
squared IV on days 1 through 5 as well as etoposide 100 mg per meter squared IV 
days 1 though 5 starting 11/04/19.  The patient will receive growth factor 
support with plans to initiate Neupogen on day 6, which will be 11/09/19.

 

The patient developed no complications during this hospital stay, had minimal 
side effects from initiation of chemotherapy.

 

He unfortunately has multiple legal constraints that affect his discharge 
planning and decision was made to transition the patient to swing status as of 
11/08/19.

 

DISPOSITION AND FURTHER TREATMENT PLANNING:  The patient is being transitioned 
from acute care to swing status as of 11/08/19.  The patient will receive 
Neupogen support starting tomorrow, Saturday, 11/09/19, for a total of 10 days.
  The patient will continue care with the surgical team for appropriate 
dressing changes to the wound VAC.  He has been transitioned to oral Augmentin, 
which will be continued indefinitely until the wound is fully healed and/or he 
has completed chemotherapy. Following the removal of the wound VAC, the patient 
will be discharged back to [facility].  The patient will receive a total of 4 
cycles of etoposide and cisplatin with growth factor support.

 

____________________________________ CYNDI CHOWDHURY

 

[id number]/[facility] #: [id number]

[facility]

## 2019-11-09 RX ADMIN — PROCHLORPERAZINE MALEATE PRN MG: 10 TABLET, FILM COATED ORAL at 17:50

## 2019-11-09 RX ADMIN — PROCHLORPERAZINE MALEATE PRN MG: 10 TABLET, FILM COATED ORAL at 08:07

## 2019-11-09 RX ADMIN — MOMETASONE FUROATE AND FORMOTEROL FUMARATE DIHYDRATE SCH PUFF: 100; 5 AEROSOL RESPIRATORY (INHALATION) at 08:53

## 2019-11-09 RX ADMIN — AMLODIPINE BESYLATE SCH MG: 5 TABLET ORAL at 09:09

## 2019-11-09 RX ADMIN — MOMETASONE FUROATE AND FORMOTEROL FUMARATE DIHYDRATE SCH: 100; 5 AEROSOL RESPIRATORY (INHALATION) at 02:43

## 2019-11-09 RX ADMIN — ASPIRIN SCH MG: 81 TABLET, COATED ORAL at 09:09

## 2019-11-09 RX ADMIN — ONDANSETRON PRN MG: 4 TABLET, ORALLY DISINTEGRATING ORAL at 19:28

## 2019-11-09 RX ADMIN — AMOXICILLIN AND CLAVULANATE POTASSIUM SCH MG: 500; 125 TABLET, FILM COATED ORAL at 09:09

## 2019-11-09 RX ADMIN — Medication SCH ML: at 17:46

## 2019-11-09 RX ADMIN — FILGRASTIM-SNDZ SCH MCG: 480 INJECTION, SOLUTION INTRAVENOUS; SUBCUTANEOUS at 09:09

## 2019-11-09 RX ADMIN — AMOXICILLIN AND CLAVULANATE POTASSIUM SCH MG: 500; 125 TABLET, FILM COATED ORAL at 19:28

## 2019-11-09 RX ADMIN — MOMETASONE FUROATE AND FORMOTEROL FUMARATE DIHYDRATE SCH PUFF: 100; 5 AEROSOL RESPIRATORY (INHALATION) at 19:25

## 2019-11-09 RX ADMIN — Medication SCH ML: at 05:21

## 2019-11-09 RX ADMIN — ENOXAPARIN SODIUM SCH MG: 40 INJECTION SUBCUTANEOUS at 19:28

## 2019-11-09 RX ADMIN — LOSARTAN POTASSIUM SCH MG: 25 TABLET, FILM COATED ORAL at 09:10

## 2019-11-09 NOTE — PN
Progress Note





- Progress Note


Date of Service: 11/09/19


SOAP: 


Subjective:


Pt seen and and dressing changed  Chart reviewed.  Pt on swing status.  

undergoing chemoTx





No complaints








Objective:





 











Temp Pulse Resp BP Pulse Ox


 


 97.7 F   77   16   144/61   100 


 


 11/09/19 11:16  11/09/19 11:16  11/09/19 11:16  11/09/19 11:16  11/09/19 11:16








R groin: 2x10x2 cm open surgical wound.  tunneling medially.  good red 

granulation tissue that bleeds easily


   vac drainage : serosang- nonfoul smelling





Assessment:


surgical site infection- resolved with open wound.  No exposed mesh; scant 

drainage.


NPWT changed by me today; next change Tuesday





Plan:


Continue NPWT- may take another 2 weeks


does not require abx or debridement

## 2019-11-10 RX ADMIN — ONDANSETRON PRN MG: 4 TABLET, ORALLY DISINTEGRATING ORAL at 16:09

## 2019-11-10 RX ADMIN — PROCHLORPERAZINE MALEATE PRN MG: 10 TABLET, FILM COATED ORAL at 16:09

## 2019-11-10 RX ADMIN — ENOXAPARIN SODIUM SCH MG: 40 INJECTION SUBCUTANEOUS at 20:12

## 2019-11-10 RX ADMIN — AMOXICILLIN AND CLAVULANATE POTASSIUM SCH MG: 500; 125 TABLET, FILM COATED ORAL at 20:11

## 2019-11-10 RX ADMIN — AMLODIPINE BESYLATE SCH MG: 5 TABLET ORAL at 08:33

## 2019-11-10 RX ADMIN — LOSARTAN POTASSIUM SCH MG: 25 TABLET, FILM COATED ORAL at 08:32

## 2019-11-10 RX ADMIN — MOMETASONE FUROATE AND FORMOTEROL FUMARATE DIHYDRATE SCH PUFF: 100; 5 AEROSOL RESPIRATORY (INHALATION) at 07:59

## 2019-11-10 RX ADMIN — ASPIRIN SCH MG: 81 TABLET, COATED ORAL at 08:32

## 2019-11-10 RX ADMIN — ONDANSETRON PRN MG: 4 TABLET, ORALLY DISINTEGRATING ORAL at 20:12

## 2019-11-10 RX ADMIN — PROCHLORPERAZINE MALEATE PRN MG: 10 TABLET, FILM COATED ORAL at 09:44

## 2019-11-10 RX ADMIN — AMOXICILLIN AND CLAVULANATE POTASSIUM SCH MG: 500; 125 TABLET, FILM COATED ORAL at 08:32

## 2019-11-10 RX ADMIN — ONDANSETRON PRN MG: 4 TABLET, ORALLY DISINTEGRATING ORAL at 08:49

## 2019-11-10 RX ADMIN — Medication SCH ML: at 16:10

## 2019-11-10 RX ADMIN — FILGRASTIM-SNDZ SCH MCG: 480 INJECTION, SOLUTION INTRAVENOUS; SUBCUTANEOUS at 08:33

## 2019-11-10 RX ADMIN — Medication SCH ML: at 06:20

## 2019-11-10 RX ADMIN — MOMETASONE FUROATE AND FORMOTEROL FUMARATE DIHYDRATE SCH PUFF: 100; 5 AEROSOL RESPIRATORY (INHALATION) at 19:18

## 2019-11-11 RX ADMIN — MOMETASONE FUROATE AND FORMOTEROL FUMARATE DIHYDRATE SCH PUFF: 100; 5 AEROSOL RESPIRATORY (INHALATION) at 19:09

## 2019-11-11 RX ADMIN — ENOXAPARIN SODIUM SCH MG: 40 INJECTION SUBCUTANEOUS at 20:02

## 2019-11-11 RX ADMIN — Medication SCH ML: at 06:24

## 2019-11-11 RX ADMIN — AMOXICILLIN AND CLAVULANATE POTASSIUM SCH MG: 500; 125 TABLET, FILM COATED ORAL at 08:52

## 2019-11-11 RX ADMIN — Medication SCH ML: at 18:37

## 2019-11-11 RX ADMIN — LOSARTAN POTASSIUM SCH MG: 25 TABLET, FILM COATED ORAL at 08:53

## 2019-11-11 RX ADMIN — FILGRASTIM-SNDZ SCH MCG: 480 INJECTION, SOLUTION INTRAVENOUS; SUBCUTANEOUS at 08:52

## 2019-11-11 RX ADMIN — AMLODIPINE BESYLATE SCH MG: 5 TABLET ORAL at 08:53

## 2019-11-11 RX ADMIN — MOMETASONE FUROATE AND FORMOTEROL FUMARATE DIHYDRATE SCH PUFF: 100; 5 AEROSOL RESPIRATORY (INHALATION) at 07:31

## 2019-11-11 RX ADMIN — PROCHLORPERAZINE MALEATE PRN MG: 10 TABLET, FILM COATED ORAL at 08:52

## 2019-11-11 RX ADMIN — ASPIRIN SCH MG: 81 TABLET, COATED ORAL at 08:52

## 2019-11-11 RX ADMIN — AMOXICILLIN AND CLAVULANATE POTASSIUM SCH MG: 500; 125 TABLET, FILM COATED ORAL at 20:02

## 2019-11-12 LAB
BASOPHILS # BLD AUTO: 0 10^3/UL (ref 0–0.2)
EOSINOPHIL # BLD AUTO: 0.1 10^3/UL (ref 0–0.6)
HCT VFR BLD AUTO: 44 % (ref 42–52)
HGB BLD-MCNC: 14.3 G/DL (ref 14–18)
LYMPHOCYTES # BLD AUTO: 0.5 10^3/UL (ref 1–4.8)
MCH RBC QN AUTO: 25 PG (ref 27–31)
MCHC RBC AUTO-ENTMCNC: 32 G/DL (ref 31–36)
MCV RBC AUTO: 78 FL (ref 80–94)
MONOCYTES # BLD AUTO: 0 10^3/UL (ref 0–0.8)
NEUTROPHILS # BLD AUTO: 3.1 10^3/UL (ref 1.5–7.7)
NRBC # BLD AUTO: 0 10^3/UL
NRBC BLD QL AUTO: 0
PLATELET # BLD AUTO: 269 10^3/UL (ref 150–450)
RBC # BLD AUTO: 5.66 10^6 /UL (ref 4.18–5.48)
WBC # BLD AUTO: 3.7 10^3/UL (ref 3.5–10.8)

## 2019-11-12 RX ADMIN — ENOXAPARIN SODIUM SCH MG: 40 INJECTION SUBCUTANEOUS at 19:51

## 2019-11-12 RX ADMIN — AMLODIPINE BESYLATE SCH MG: 5 TABLET ORAL at 09:51

## 2019-11-12 RX ADMIN — AMOXICILLIN AND CLAVULANATE POTASSIUM SCH MG: 500; 125 TABLET, FILM COATED ORAL at 19:51

## 2019-11-12 RX ADMIN — LOSARTAN POTASSIUM SCH MG: 25 TABLET, FILM COATED ORAL at 09:51

## 2019-11-12 RX ADMIN — AMOXICILLIN AND CLAVULANATE POTASSIUM SCH MG: 500; 125 TABLET, FILM COATED ORAL at 09:52

## 2019-11-12 RX ADMIN — FILGRASTIM-SNDZ SCH MCG: 480 INJECTION, SOLUTION INTRAVENOUS; SUBCUTANEOUS at 09:49

## 2019-11-12 RX ADMIN — Medication SCH ML: at 17:57

## 2019-11-12 RX ADMIN — MOMETASONE FUROATE AND FORMOTEROL FUMARATE DIHYDRATE SCH PUFF: 100; 5 AEROSOL RESPIRATORY (INHALATION) at 08:06

## 2019-11-12 RX ADMIN — MOMETASONE FUROATE AND FORMOTEROL FUMARATE DIHYDRATE SCH PUFF: 100; 5 AEROSOL RESPIRATORY (INHALATION) at 19:09

## 2019-11-12 RX ADMIN — Medication SCH ML: at 06:12

## 2019-11-12 RX ADMIN — ASPIRIN SCH MG: 81 TABLET, COATED ORAL at 09:51

## 2019-11-12 NOTE — PN
Progress Note





- Progress Note


Date of Service: 11/12/19


SOAP: 


Subjective:


[]Feeling well, hiccups with therapy but then better. Leg is improving. No 

fevers. No nausea. 





CE C1D8





Acetaminophen (Tylenol Tab*)  650 mg PO Q4H PRN


   PRN Reason: pain/fever


Albuterol (Ventolin Hfa Inhaler*)  2 puff INH QID PRN


   PRN Reason: SOB/WHEEZING


Amlodipine Besylate (Norvasc Tab*)  5 mg PO QAM Counts include 234 beds at the [facility]


   Last Admin: 11/12/19 09:51 Dose:  5 mg


Amoxicillin/Clavulanate Potassium (Augmentin Tab*)  500 mg PO BID Counts include 234 beds at the [facility]


   Last Admin: 11/12/19 09:52 Dose:  500 mg


Aspirin (Aspirin Ec Tab*)  81 mg PO DAILY Counts include 234 beds at the [facility]


   Last Admin: 11/12/19 09:51 Dose:  81 mg


Enoxaparin Sodium (Lovenox(*))  40 mg SUBCUT Q24H Counts include 234 beds at the [facility]


   Last Admin: 11/11/19 20:02 Dose:  40 mg


Filgrastim-Sndz (Zarxio*)  480 mcg SUBCUT DAILY Counts include 234 beds at the [facility]


   Stop: 11/18/19 09:01


   Last Admin: 11/12/19 09:49 Dose:  480 mcg


Heparin Sodium (Porcine) (Heparin Flush Picc/Ml/Cvc(*))  1 ml FLUSH 0600,1800 

Counts include 234 beds at the [facility]; Protocol


   Last Admin: 11/12/19 06:12 Dose:  1 ml


Losartan Potassium (Cozaar Tab*)  100 mg PO DAILY Counts include 234 beds at the [facility]


   Last Admin: 11/12/19 09:51 Dose:  100 mg


Mometasone Furoate/Formoterol Fumar (Dulera 100/5 Mdi*)  2 puff INH BID Counts include 234 beds at the [facility]


   Last Admin: 11/12/19 08:06 Dose:  2 puff


Ondansetron HCl (Zofran Odt Tab*)  4 mg PO Q4H PRN


   PRN Reason: NAUSEA


   Last Admin: 11/10/19 20:12 Dose:  4 mg


Prochlorperazine (Compazine Tab*)  10 mg PO Q6HR PRN


   PRN Reason: NAUSEA


   Last Admin: 11/11/19 08:52 Dose:  10 mg








Objective:


[]


 Vital Signs











Temp Pulse Resp BP Pulse Ox


 


 97.9 F   113   18   122/70   98 


 


 11/12/19 07:00  11/12/19 08:08  11/12/19 08:08  11/12/19 07:00  11/12/19 08:08








HEENT: OM moist, no lesions


CTA


RRR S1S2


+BS NT ND, obese


wound vac  in pace, no erythema


Ext no c/c/e








Assessment:


[]29 year old LN+ non seminoma s/w C1 CE and tolerating well. 








Plan:


[]1. Decrease Dex for C2 for hiccups


2. Continue Neupogen


3. Check CBC today and Thursday


4. Wound vac through Monday and then possible D/C


5. C2 starts 12/2/19

## 2019-11-12 NOTE — PN
Progress Note





- Progress Note


Date of Service: 11/12/19


SOAP: 


Subjective: Pt seen, in NAD, on swing status currently on chemo.  No complaints


[]








Objective:


 Vital Signs











Temp  97.9 F   11/12/19 07:00


 


Pulse  113   11/12/19 08:08


 


Resp  18   11/12/19 08:08


 


BP  122/70   11/12/19 07:00


 


Pulse Ox  98   11/12/19 08:08








 Intake & Output











 11/11/19 11/12/19 11/12/19





 18:59 06:59 18:59


 


Intake Total 960 0 660


 


Balance 960 0 660


 


Intake:   


 


  Oral 960 0 660


 


Other:   


 


  Estimated Void Medium  


 


  Date of Last Bowel 112081  





  Movement   


 


  # Bowel Movements 2 0 


 


  Estimated Stool Amount Medium  


 


  # Voids 2 0 





Wound Right Groin


previous change dimensions 10 x 2 


now 6 x 1.5


good beefy red granulation tissue, no odor, no purulence, small amount serosang 

drainage, no exposed mesh,


+ medial tunneling





[]








Assessment: Post op surgical site infxn with open wound right groin treated 

with neg pressure vac dressing.


[]








Plan: dressing changed today, chart reviewed.  next dressing  change friday 11/

15.  no debridement needed.  Pt also seen by Dr Pardo 


[]

## 2019-11-13 LAB
BASOPHILS # BLD AUTO: 0 10^3/UL (ref 0–0.2)
EOSINOPHIL # BLD AUTO: 0.1 10^3/UL (ref 0–0.6)
HCT VFR BLD AUTO: 43 % (ref 42–52)
HGB BLD-MCNC: 13.6 G/DL (ref 14–18)
LYMPHOCYTES # BLD AUTO: 0.6 10^3/UL (ref 1–4.8)
MCH RBC QN AUTO: 25 PG (ref 27–31)
MCHC RBC AUTO-ENTMCNC: 32 G/DL (ref 31–36)
MCV RBC AUTO: 78 FL (ref 80–94)
MONOCYTES # BLD AUTO: 0 10^3/UL (ref 0–0.8)
NEUTROPHILS # BLD AUTO: 1 10^3/UL (ref 1.5–7.7)
NRBC # BLD AUTO: 0 10^3/UL
NRBC BLD QL AUTO: 0.3
PLATELET # BLD AUTO: 266 10^3/UL (ref 150–450)
RBC # BLD AUTO: 5.49 10^6 /UL (ref 4.18–5.48)
WBC # BLD AUTO: 1.7 10^3/UL (ref 3.5–10.8)

## 2019-11-13 RX ADMIN — ENOXAPARIN SODIUM SCH MG: 40 INJECTION SUBCUTANEOUS at 20:07

## 2019-11-13 RX ADMIN — FILGRASTIM-SNDZ SCH MCG: 480 INJECTION, SOLUTION INTRAVENOUS; SUBCUTANEOUS at 09:37

## 2019-11-13 RX ADMIN — Medication SCH ML: at 06:33

## 2019-11-13 RX ADMIN — MOMETASONE FUROATE AND FORMOTEROL FUMARATE DIHYDRATE SCH PUFF: 100; 5 AEROSOL RESPIRATORY (INHALATION) at 08:46

## 2019-11-13 RX ADMIN — Medication SCH ML: at 18:14

## 2019-11-13 RX ADMIN — LOSARTAN POTASSIUM SCH MG: 25 TABLET, FILM COATED ORAL at 09:37

## 2019-11-13 RX ADMIN — AMOXICILLIN AND CLAVULANATE POTASSIUM SCH MG: 500; 125 TABLET, FILM COATED ORAL at 09:37

## 2019-11-13 RX ADMIN — AMOXICILLIN AND CLAVULANATE POTASSIUM SCH MG: 500; 125 TABLET, FILM COATED ORAL at 20:07

## 2019-11-13 RX ADMIN — MOMETASONE FUROATE AND FORMOTEROL FUMARATE DIHYDRATE SCH PUFF: 100; 5 AEROSOL RESPIRATORY (INHALATION) at 20:19

## 2019-11-13 RX ADMIN — ASPIRIN SCH MG: 81 TABLET, COATED ORAL at 09:37

## 2019-11-13 RX ADMIN — AMLODIPINE BESYLATE SCH MG: 5 TABLET ORAL at 09:36

## 2019-11-14 RX ADMIN — MOMETASONE FUROATE AND FORMOTEROL FUMARATE DIHYDRATE SCH PUFF: 100; 5 AEROSOL RESPIRATORY (INHALATION) at 07:40

## 2019-11-14 RX ADMIN — Medication SCH ML: at 17:08

## 2019-11-14 RX ADMIN — LOSARTAN POTASSIUM SCH MG: 25 TABLET, FILM COATED ORAL at 10:31

## 2019-11-14 RX ADMIN — Medication SCH ML: at 05:35

## 2019-11-14 RX ADMIN — AMOXICILLIN AND CLAVULANATE POTASSIUM SCH MG: 500; 125 TABLET, FILM COATED ORAL at 19:39

## 2019-11-14 RX ADMIN — PROCHLORPERAZINE MALEATE PRN MG: 10 TABLET, FILM COATED ORAL at 19:39

## 2019-11-14 RX ADMIN — MOMETASONE FUROATE AND FORMOTEROL FUMARATE DIHYDRATE SCH PUFF: 100; 5 AEROSOL RESPIRATORY (INHALATION) at 23:02

## 2019-11-14 RX ADMIN — AMLODIPINE BESYLATE SCH MG: 5 TABLET ORAL at 10:32

## 2019-11-14 RX ADMIN — FILGRASTIM-SNDZ SCH MCG: 480 INJECTION, SOLUTION INTRAVENOUS; SUBCUTANEOUS at 10:33

## 2019-11-14 RX ADMIN — ENOXAPARIN SODIUM SCH MG: 40 INJECTION SUBCUTANEOUS at 19:39

## 2019-11-14 RX ADMIN — ASPIRIN SCH MG: 81 TABLET, COATED ORAL at 10:32

## 2019-11-14 RX ADMIN — AMOXICILLIN AND CLAVULANATE POTASSIUM SCH MG: 500; 125 TABLET, FILM COATED ORAL at 10:32

## 2019-11-15 LAB
ALBUMIN SERPL BCG-MCNC: 3.9 G/DL (ref 3.2–5.2)
ALBUMIN/GLOB SERPL: 1.3 {RATIO} (ref 1–3)
ALP SERPL-CCNC: 88 U/L (ref 34–104)
ALT SERPL W P-5'-P-CCNC: 128 U/L (ref 7–52)
ANION GAP SERPL CALC-SCNC: 5 MMOL/L (ref 2–11)
AST SERPL-CCNC: 35 U/L (ref 13–39)
BUN SERPL-MCNC: 11 MG/DL (ref 6–24)
BUN/CREAT SERPL: 12.6 (ref 8–20)
CALCIUM SERPL-MCNC: 9.4 MG/DL (ref 8.6–10.3)
CHLORIDE SERPL-SCNC: 101 MMOL/L (ref 101–111)
GLOBULIN SER CALC-MCNC: 3.1 G/DL (ref 2–4)
GLUCOSE SERPL-MCNC: 79 MG/DL (ref 70–100)
HCO3 SERPL-SCNC: 31 MMOL/L (ref 22–32)
HCT VFR BLD AUTO: 39 % (ref 42–52)
HGB BLD-MCNC: 12.9 G/DL (ref 14–18)
MCH RBC QN AUTO: 25 PG (ref 27–31)
MCHC RBC AUTO-ENTMCNC: 33 G/DL (ref 31–36)
MCV RBC AUTO: 76 FL (ref 80–94)
PLATELET # BLD AUTO: 158 10^3/UL (ref 150–450)
POTASSIUM SERPL-SCNC: 3.8 MMOL/L (ref 3.5–5)
PROT SERPL-MCNC: 7 G/DL (ref 6.4–8.9)
RBC # BLD AUTO: 5.14 10^6 /UL (ref 4.18–5.48)
SODIUM SERPL-SCNC: 137 MMOL/L (ref 135–145)
WBC # BLD AUTO: 0.8 10^3/UL (ref 3.5–10.8)

## 2019-11-15 RX ADMIN — Medication SCH ML: at 05:57

## 2019-11-15 RX ADMIN — AMOXICILLIN AND CLAVULANATE POTASSIUM SCH MG: 500; 125 TABLET, FILM COATED ORAL at 19:40

## 2019-11-15 RX ADMIN — ENOXAPARIN SODIUM SCH MG: 40 INJECTION SUBCUTANEOUS at 19:39

## 2019-11-15 RX ADMIN — MOMETASONE FUROATE AND FORMOTEROL FUMARATE DIHYDRATE SCH PUFF: 100; 5 AEROSOL RESPIRATORY (INHALATION) at 08:01

## 2019-11-15 RX ADMIN — FILGRASTIM-SNDZ SCH MCG: 480 INJECTION, SOLUTION INTRAVENOUS; SUBCUTANEOUS at 09:04

## 2019-11-15 RX ADMIN — Medication SCH ML: at 19:40

## 2019-11-15 RX ADMIN — MOMETASONE FUROATE AND FORMOTEROL FUMARATE DIHYDRATE SCH PUFF: 100; 5 AEROSOL RESPIRATORY (INHALATION) at 20:33

## 2019-11-15 RX ADMIN — AMOXICILLIN AND CLAVULANATE POTASSIUM SCH MG: 500; 125 TABLET, FILM COATED ORAL at 09:04

## 2019-11-15 RX ADMIN — AMLODIPINE BESYLATE SCH MG: 5 TABLET ORAL at 09:05

## 2019-11-15 RX ADMIN — LOSARTAN POTASSIUM SCH MG: 25 TABLET, FILM COATED ORAL at 09:04

## 2019-11-15 RX ADMIN — ASPIRIN SCH MG: 81 TABLET, COATED ORAL at 09:04

## 2019-11-15 NOTE — DS
DISCHARGE SUMMARY:

 

DATE OF ADMISSION:  To swing, 11/08/19.

 

To hospital, 10/30/19.

 

DATE OF DISCHARGE:

 

STATUS:  Discharging from swing status.

 

DISCHARGE DIAGNOSES:

1.  Testicular cancer, status post cisplatin and etoposide chemotherapy.

2.  Right groin abscess, infection.

 

HOSPITAL COURSE:  A 29-year-old male with stage III non-seminoma testicular 
cancer, who underwent orchiectomy, complicated by nonhealing right groin 
incision and then groin tracking outside down 7 cm.  Cultured and growing 
pansensitive citrobacter. He had treatment delayed several weeks for ongoing 
infection and then was admitted for surgical exploration.  He has a known mesh 
from hernia repair, but on exploration, it was found that the mesh was not 
infected.  He had surgical drainage followed by wound VAC placement on 10/31/19 
and then proceeded to have IV chemotherapy starting 11/04/19.  Neupogen was 
started on day 6, 11/09/19.  He tolerated chemotherapy without significant side 
effects.  He has been maintained on antibiotics, currently Augmentin, which he 
takes twice a day.  Currently, scheduled to be on Neupogen through 11/18/19.

 

He had the wound VAC removed today.  He is currently feeling well.  He has 
instructions from Surgery for changing and maintaining his wound.

 

We will plan on giving him Neupogen tomorrow morning and then he is reasonable 
for discharge.

 

His blood counts today, a total white count of 1800, hemoglobin 12.9, and 
platelets of 158,000, day 12.  We will check a CBC tomorrow morning, plan 
discharge pending results. 

 

DISCHARGE MEDICATIONS:

1.  Continue acetaminophen 325 q.4 p.r.n.

2.  Continue amlodipine 5 mg p.o. daily.

3.  Continue amoxicillin 500 mg p.o. b.i.d.

4.  Continue Advair Diskus 1 puff b.i.d.

5.  Continue irbesartan and HCTZ 300/12.5 one a day.

6.  Continue guaifenesin q.4 p.r.n.

 

ACTIVITY:  Light activity as tolerated.

 

Showering instructions per Surgery.

 

CONDITION ON DISCHARGE:  Stable.

 

DISPOSITION:  To [facility].

 

FOLLOWUP INSTRUCTIONS:  Will return to clinic next week for followup CBC and 
exam. Next cycle due on 12/02/19.

 

Instructed to call with any fever, a temperature over 100.5, any change in his 
groin lesion, any other concerns.

 

 [id number]/CPS #: [id number]

ROSA M

## 2019-11-15 NOTE — PN
Progress Note





- Progress Note


Date of Service: 11/15/19


SOAP: 


Subjective:


Patient seen and examined.  He is well today and wants to go home.





Objective:





 











Temp Pulse Resp BP Pulse Ox


 


 97.4 F   100   16   128/78   98 


 


 11/15/19 07:15  11/15/19 08:02  11/15/19 08:02  11/15/19 07:15  11/15/19 08:02








Vacuum dressing removed today.  Significant improvement realized.  Wound 

measures 0.8 x 4 cm by less than 1 cm.  Good granulation tissue.  Scant 

drainage.  Periwound skin is intact.





Assessment:


[Surgical site infection resolved.  Wound healing well.


Plan:


No additional vacuum dressing required.  Twice a day dressing including 

antibiotic ointment and gauze and tape 


I went over this with the patient.


Case discussed with oncology.


Follow up in the wound center next Friday with me.  Appointment needs to be 

made by the clerical staff on the floor.


Recall surgery as needed during any additional hospitalization

## 2019-11-16 VITALS — DIASTOLIC BLOOD PRESSURE: 78 MMHG | SYSTOLIC BLOOD PRESSURE: 144 MMHG

## 2019-11-16 LAB
BASOPHILS # BLD AUTO: 0 10^3/UL (ref 0–0.2)
EOSINOPHIL # BLD AUTO: 0 10^3/UL (ref 0–0.6)
HCT VFR BLD AUTO: 40 % (ref 42–52)
HGB BLD-MCNC: 13.1 G/DL (ref 14–18)
LYMPHOCYTES # BLD AUTO: 0.7 10^3/UL (ref 1–4.8)
MCH RBC QN AUTO: 25 PG (ref 27–31)
MCHC RBC AUTO-ENTMCNC: 33 G/DL (ref 31–36)
MCV RBC AUTO: 76 FL (ref 80–94)
MONOCYTES # BLD AUTO: 0.2 10^3/UL (ref 0–0.8)
NEUTROPHILS # BLD AUTO: 0.1 10^3/UL (ref 1.5–7.7)
NRBC # BLD AUTO: 0 10^3/UL
NRBC BLD QL AUTO: 0.3
PLATELET # BLD AUTO: 132 10^3/UL (ref 150–450)
RBC # BLD AUTO: 5.19 10^6 /UL (ref 4.18–5.48)
WBC # BLD AUTO: 1 10^3/UL (ref 3.5–10.8)

## 2019-11-16 RX ADMIN — Medication SCH ML: at 06:00

## 2019-11-16 RX ADMIN — ASPIRIN SCH MG: 81 TABLET, COATED ORAL at 09:07

## 2019-11-16 RX ADMIN — AMOXICILLIN AND CLAVULANATE POTASSIUM SCH MG: 500; 125 TABLET, FILM COATED ORAL at 09:07

## 2019-11-16 RX ADMIN — LOSARTAN POTASSIUM SCH MG: 25 TABLET, FILM COATED ORAL at 09:07

## 2019-11-16 RX ADMIN — MOMETASONE FUROATE AND FORMOTEROL FUMARATE DIHYDRATE SCH PUFF: 100; 5 AEROSOL RESPIRATORY (INHALATION) at 07:09

## 2019-11-16 RX ADMIN — FILGRASTIM-SNDZ SCH MCG: 480 INJECTION, SOLUTION INTRAVENOUS; SUBCUTANEOUS at 09:07

## 2019-11-16 RX ADMIN — AMLODIPINE BESYLATE SCH MG: 5 TABLET ORAL at 09:07

## 2019-12-10 ENCOUNTER — HOSPITAL ENCOUNTER (INPATIENT)
Dept: HOSPITAL 25 - ED | Age: 29
LOS: 1 days | Discharge: HOME | DRG: 63 | End: 2019-12-11
Attending: INTERNAL MEDICINE | Admitting: INTERNAL MEDICINE
Payer: MEDICARE

## 2019-12-10 DIAGNOSIS — Z92.21: ICD-10-CM

## 2019-12-10 DIAGNOSIS — F70: ICD-10-CM

## 2019-12-10 DIAGNOSIS — J45.909: ICD-10-CM

## 2019-12-10 DIAGNOSIS — J44.9: ICD-10-CM

## 2019-12-10 DIAGNOSIS — G45.9: Primary | ICD-10-CM

## 2019-12-10 DIAGNOSIS — R29.704: ICD-10-CM

## 2019-12-10 DIAGNOSIS — Z85.47: ICD-10-CM

## 2019-12-10 DIAGNOSIS — D45: ICD-10-CM

## 2019-12-10 DIAGNOSIS — I10: ICD-10-CM

## 2019-12-10 LAB
ALBUMIN SERPL BCG-MCNC: 4.2 G/DL (ref 3.2–5.2)
ALBUMIN/GLOB SERPL: 1.4 {RATIO} (ref 1–3)
ALP SERPL-CCNC: 109 U/L (ref 34–104)
ALT SERPL W P-5'-P-CCNC: 21 U/L (ref 7–52)
ANION GAP SERPL CALC-SCNC: 6 MMOL/L (ref 2–11)
APTT PPP: 28.7 SECONDS (ref 26–38)
AST SERPL-CCNC: 14 U/L (ref 13–39)
BASOPHILS # BLD AUTO: 0 10^3/UL (ref 0–0.2)
BUN SERPL-MCNC: 10 MG/DL (ref 6–24)
BUN/CREAT SERPL: 9.3 (ref 8–20)
CALCIUM SERPL-MCNC: 9.4 MG/DL (ref 8.6–10.3)
CHLORIDE SERPL-SCNC: 98 MMOL/L (ref 101–111)
CHOLEST SERPL-MCNC: 163 MG/DL
EOSINOPHIL # BLD AUTO: 0.1 10^3/UL (ref 0–0.6)
GLOBULIN SER CALC-MCNC: 2.9 G/DL (ref 2–4)
GLUCOSE SERPL-MCNC: 113 MG/DL (ref 70–100)
HCO3 SERPL-SCNC: 31 MMOL/L (ref 22–32)
HCT VFR BLD AUTO: 42 % (ref 42–52)
HDLC SERPL-MCNC: 33.7 MG/DL
HGB BLD-MCNC: 13.5 G/DL (ref 14–18)
INR PPP/BLD: 1.09 (ref 0.82–1.09)
LYMPHOCYTES # BLD AUTO: 0.5 10^3/UL (ref 1–4.8)
MCH RBC QN AUTO: 25 PG (ref 27–31)
MCHC RBC AUTO-ENTMCNC: 33 G/DL (ref 31–36)
MCV RBC AUTO: 77 FL (ref 80–94)
MONOCYTES # BLD AUTO: 0.1 10^3/UL (ref 0–0.8)
NEUTROPHILS # BLD AUTO: 1.7 10^3/UL (ref 1.5–7.7)
NRBC # BLD AUTO: 0 10^3/UL
NRBC BLD QL AUTO: 0
PLATELET # BLD AUTO: 110 10^3/UL (ref 150–450)
POTASSIUM SERPL-SCNC: 3.8 MMOL/L (ref 3.5–5)
PROT SERPL-MCNC: 7.1 G/DL (ref 6.4–8.9)
RBC # BLD AUTO: 5.43 10^6 /UL (ref 4.18–5.48)
SODIUM SERPL-SCNC: 135 MMOL/L (ref 135–145)
TRIGL SERPL-MCNC: 109 MG/DL
TROPONIN I SERPL-MCNC: 0 NG/ML (ref ?–0.03)
WBC # BLD AUTO: 2.3 10^3/UL (ref 3.5–10.8)

## 2019-12-10 PROCEDURE — 87389 HIV-1 AG W/HIV-1&-2 AB AG IA: CPT

## 2019-12-10 PROCEDURE — 83605 ASSAY OF LACTIC ACID: CPT

## 2019-12-10 PROCEDURE — 3E03317 INTRODUCTION OF OTHER THROMBOLYTIC INTO PERIPHERAL VEIN, PERCUTANEOUS APPROACH: ICD-10-PCS | Performed by: INTERNAL MEDICINE

## 2019-12-10 PROCEDURE — 87641 MR-STAPH DNA AMP PROBE: CPT

## 2019-12-10 PROCEDURE — 94640 AIRWAY INHALATION TREATMENT: CPT

## 2019-12-10 PROCEDURE — 84100 ASSAY OF PHOSPHORUS: CPT

## 2019-12-10 PROCEDURE — 80061 LIPID PANEL: CPT

## 2019-12-10 PROCEDURE — 81003 URINALYSIS AUTO W/O SCOPE: CPT

## 2019-12-10 PROCEDURE — 70498 CT ANGIOGRAPHY NECK: CPT

## 2019-12-10 PROCEDURE — 80053 COMPREHEN METABOLIC PANEL: CPT

## 2019-12-10 PROCEDURE — 70551 MRI BRAIN STEM W/O DYE: CPT

## 2019-12-10 PROCEDURE — 36415 COLL VENOUS BLD VENIPUNCTURE: CPT

## 2019-12-10 PROCEDURE — 85610 PROTHROMBIN TIME: CPT

## 2019-12-10 PROCEDURE — 99285 EMERGENCY DEPT VISIT HI MDM: CPT

## 2019-12-10 PROCEDURE — 83735 ASSAY OF MAGNESIUM: CPT

## 2019-12-10 PROCEDURE — 4A00X4Z MEASUREMENT OF CENTRAL NERVOUS ELECTRICAL ACTIVITY, EXTERNAL APPROACH: ICD-10-PCS

## 2019-12-10 PROCEDURE — 95819 EEG AWAKE AND ASLEEP: CPT

## 2019-12-10 PROCEDURE — 85060 BLOOD SMEAR INTERPRETATION: CPT

## 2019-12-10 PROCEDURE — 84484 ASSAY OF TROPONIN QUANT: CPT

## 2019-12-10 PROCEDURE — 71045 X-RAY EXAM CHEST 1 VIEW: CPT

## 2019-12-10 PROCEDURE — 85025 COMPLETE CBC W/AUTO DIFF WBC: CPT

## 2019-12-10 PROCEDURE — 93005 ELECTROCARDIOGRAM TRACING: CPT

## 2019-12-10 PROCEDURE — 70496 CT ANGIOGRAPHY HEAD: CPT

## 2019-12-10 PROCEDURE — 83036 HEMOGLOBIN GLYCOSYLATED A1C: CPT

## 2019-12-10 PROCEDURE — 85730 THROMBOPLASTIN TIME PARTIAL: CPT

## 2019-12-10 PROCEDURE — 93306 TTE W/DOPPLER COMPLETE: CPT

## 2019-12-10 PROCEDURE — 70450 CT HEAD/BRAIN W/O DYE: CPT

## 2019-12-10 RX ADMIN — MOMETASONE FUROATE AND FORMOTEROL FUMARATE DIHYDRATE SCH PUFF: 100; 5 AEROSOL RESPIRATORY (INHALATION) at 19:21

## 2019-12-10 NOTE — ED
Neurological HPI





- HPI Summary


HPI Summary: 


Patient is a 30 y/o M presenting to [facility] with complaints of slurred speech, 

right arm and right facial numbness. Last known well is reported to be 1244 12/

10/19 with Sx onset at 1245. Patient lives in assisted-living home, caretaker 

is present with patient. Caretaker reports that they were in a vehicle together 

when Sx onset. Patient had onset of right-sided muffled hearing upon their 

entry to ED. Currently, patient notes some tingling to his right hand and face. 

Slurred speech is still present. He is not on blood thinners. Patient has Hx of 

testicular cancer for which he is on chemotherapy. Patient arrival by private 

vehicle at 1309, code gray called at 1316 from triage by Dr. Spencer, Dr. Albert 

at bedside to evaluate patient at 1318, [facility] completed and patient wheeled to CT 

at 1323. 








- History of Current Complaint


Chief Complaint: EDNeurologicalDeficit


Stated Complaint: SLURRED SPEECH


Hx Obtained From: Patient


Onset/Duration: Started minutes ago, Still Present


Timing: Constant


Neurological Deficit Location: Facial, RUE


Pain Intensity: 0


Pain Scale Used: 0-10 Numeric


Character: Numbness/Tingling, Impaired Speech


Associated Signs and Symptoms: Positive: Impaired Speech, Numbness





- Additional Pertinent History


Primary Care Physician: [id number]





- Allergy/Home Medications


Allergies/Adverse Reactions: 


 Allergies











Allergy/AdvReac Type Severity Reaction Status Date / Time


 


No Known Allergies Allergy   Verified 12/10/19 14:21











Home Medications: 


 Home Medications





Albuterol HFA INHALER* [Ventolin HFA Inhaler*] 1 - 2 puff INH Q4H PRN 12/10/19 [

History Confirmed 12/10/19]


Albuterol HFA INHALER* [Ventolin HFA Inhaler*] 1 - 2 puff INH Q4H PRN 12/10/19 [

History Confirmed 12/10/19]


Albuterol Sulfate [Proair Digihaler] 2 puff INH QID PRN 12/10/19 [History 

Confirmed 12/10/19]


Fluticasone Propion/Salmeterol [Wixela 100-50 Inhub] 1 each INH BID 12/10/19 [

History Confirmed 12/10/19]


Ibuprofen TAB* [Advil TAB*] 200 mg PO TID PRN 12/10/19 [History Confirmed 12/10/

19]


Lactobacillus Acidophilus/Fos [Acidophilus Probiotic Tablet] 1 each PO DAILY 12/

10/19 [History Confirmed 12/10/19]


Magnesium CITRATE* [Citrate of Magnesia*] 147 ml PO ONCE 12/10/19 [History 

Confirmed 12/10/19]


Magnesium Oxide TAB* [MagOx 400 TAB*] 400 mg PO BID 12/10/19 [History Confirmed 

12/10/19]


OLANzapine TAB* [Zyprexa 10 MG TAB*] 10 mg PO DAILY 12/10/19 [History Confirmed 

12/10/19]


Ondansetron ODT TAB* [Zofran 4 MG Odt TAB*] 4 mg PO Q4H PRN 12/10/19 [History 

Confirmed 12/10/19]


Prochlorperazine Maleate 10 mg PO Q6H PRN 12/10/19 [History Confirmed 12/10/19]











PMH/Surg Hx/FS Hx/Imm Hx


Endocrine/Hematology History: Reports: Hx Bone Marrow Disease - Polycythemia 

vera, stable


   Denies: Hx Diabetes


Cardiovascular History: Reports: Hx Hypertension - controlled with medication, 

Other Cardiovascular Problems/Disorders - congenital heart anomaly repaired at 

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


Respiratory History: Reports: Hx Asthma - since childhood, controlled with 

medication


   Denies: Hx Chronic Obstructive Pulmonary Disease (COPD)


GI History: Reports: Other GI Disorders - UMBILICAL HERNIA


 History: Reports: Other  Problems/Disorders - RIGHT TESTE MALIGNANT 

NEOPLASM


   Denies: Hx Renal Disease


Sensory History: 


   Denies: Hx Cataracts, Hx Contacts or Glasses, Hx Glaucoma, Hx Hearing Aid


Opthamlomology History: 


   Denies: Hx Cataracts, Hx Contacts or Glasses, Hx Glaucoma


Neurological History: Reports: Hx Seizures - last episode was in grade school, 

1997-98?, Other Neuro Impairments/Disorders - gran mal seizure hx, see above





- Cancer History


Cancer Type, Location and Year: testicular cancer





- Surgical History


Surgery Procedure, Year, and Place: surgery age 3 for congenital heart anomaly.

  T&A childhood.  HERNIA SX


Hx Anesthesia Reactions: No


Infectious Disease History: No


Infectious Disease History: 


   Denies: History Other Infectious Disease, Traveled Outside the [place] in Last 30 

Days





- Family History


Known Family History: Positive: Hypertension





- Social History


Alcohol Use: Occasionally


Alcohol Amount: 1 drink per week


Hx Substance Use: No


Substance Use Type: Reports: None


Hx Tobacco Use: No


Smoking Status (MU): Never Smoked Tobacco


Have You Smoked in the Last Year: No





Review of Systems


Negative: Fever


ENT: Other - positive - muffled hearing of right ear 


Positive: Numbness, Slurred Speech


All Other Systems Reviewed And Are Negative: Yes





Physical Exam





- Summary


Physical Exam Summary: 


Constitutional: Well-developed, Well-nourished, Alert. (-) Distressed


Skin: Warm, Dry


HENT: Normocephalic; Atraumatic


Eyes: Conjunctiva normal


Neck: Musculoskeletal ROM normal neck. (-) JVD, (-) Stridor, (-) Tracheal 

deviation


Cardio: Rhythm regular, rate normal, Heart sounds normal; Intact distal pulses; 

Radial pulses are 2+ and symmetric. (-) Murmur


Pulmonary/Chest wall: Effort normal. (-) Respiratory distress, (-) Wheezes, (-) 

Rales


Abd: Soft, (-) tenderness, (-) Distension, (-) Guarding, (-) Rebound


Musculoskeletal: (-) Edema


Lymph: (-) Cervical adenopathy


Neuro: Alert, Oriented x3; patient has slurred speech, but the speech is 

recognizable; there is decreased sensation to the right face and extinction of 

the right side to tactile and visual stimulus. 


Psych: Mood and affect Normal








Triage Information Reviewed: Yes


Vital Signs On Initial Exam: 


 Initial Vitals











Temp Pulse Resp BP Pulse Ox


 


 97.6 F   111   16   90/37   96 


 


 12/10/19 13:11  12/10/19 13:11  12/10/19 13:11  12/10/19 13:11  12/10/19 13:11











Vital Signs Reviewed: Yes





- [place] Coma Scale


Best Eye Response: 4 - Spontaneous


Best Motor Response: 6 - Obeys Commands


Best Verbal Response: 5 - Oriented


Coma Scale Total: 15





Procedures





- Sedation


Patient Received Moderate/Deep Sedation with Procedure: No





Diagnostics





- Vital Signs


 Vital Signs











  Temp Pulse Resp BP Pulse Ox


 


 12/10/19 13:11  97.6 F  111  16  90/37  96














- Laboratory


Result Diagrams: 


 12/10/19 14:02





 12/10/19 14:02


Lab Statement: Any lab studies that have been ordered have been reviewed, and 

results considered in the medical decision making process.





- Radiology


  ** CXR


Radiology Interpretation Completed By: Radiologist


Summary of Radiographic Findings: CXR IMPRESSION: NO ACTIVE CARDIOPULMONARY 

DISEASE IS NOTED. ED PHYSICIAN HAS REVIEWED THIS REPORT.





- CT


  ** BRAIN CT


CT Interpretation Completed By: Radiologist


Summary of CT Findings: BRAIN CT IMPRESSION:  NO ACUTE INTRACRANIAL PATHOLOGY.  

ED PHYSICIAN HAS REVIEWED THIS REPORT.





  ** HEAD CTA 


CT Interpretation Completed By: Radiologist


Summary of CT Findings: HEAD CTA IMPRESSION:  1.  NO ANEURYSM, VASCULAR 

MALFORMATION, OCCLUSION, OR STENOSIS OF THE VISUALIZED.  INTRACRANIAL 

CIRCULATION.  2.  NO INTERNAL CAROTID ARTERY STENOSIS BY NASCET CRITERIA.  ED 

PHYSICIAN HAS REVIEWED THIS REPORT.





NIH Scale





- NIH Scale


Level of Consciousness: Alert/Keenly Responsive


Ask Patient the Month and His/Her Age: Both Correct


Ask Pt to Open/Close Eyes and /Release Non-Paretic Hand: Both Correctly


Best Gaze (Only Horizontal Eye Movement): Normal


Visual Field Testing: No Visual Loss


Facial Paresis-Pt to Smile & Close Eyes or Grimace Symmetry: Normal/Symmetrical


Motor Function - Right Arm: No Drift-Holds 10 Seconds


Motor Function - Left Arm: No Drift-Holds 10 Seconds


Motor Function - Right Leg: No Drift-Holds 10 Seconds


Motor Function - Left Leg: No Drift-Holds 10 Seconds


Limb Ataxia-Must be out of Proportion to Weakness Present: Absent


Sensory (Use Pinprick to Test Arms/Legs/Trunk/Face): Pinprick Less on Affected


Best Language (Describe Picture, Name Items): Some Loss


Dysarthria (Read Several Words): Slurs Some Words


Extinction and Inattention: Inattention


Total Score: 4





Course/Dx





- Course


Course Of Treatment: Patient was brought in with slurred speech and right-sided 

tingling.  Patient had a code gray call from triage.  Patient was emergently 

[place] by myself and the neurologist.  A CT brain was performed which showed no 

hemorrhage.  Patient had trouble getting IV access which was 5 to myself using 

ultrasound.  Patient was given tPA as he had an NIHSS of 4 and is in the TPA 

window.  Patient improvement in his symptoms.  Patient had CTA which showed no 

large vessel occlusion.  He was admitted to the ICU.





- Diagnoses


Provider Diagnoses: 


 CVA (cerebral vascular accident)





During the Visit The Following Alert/Code Occurred: Code Grey - Patient arrival 

by private vehicle at 1309, code gray called at 1316 on overhead from triage by 

Dr. Spencer, Dr. Albert at bedside to evaluate patient at 1318, NIH completed 

and patient wheeled to CT at 1323. CT, CTA recommended. Patient is within time 

window for tPA, tPA usage in indicated in this patient's case. Risks and 

benefits of TPA administration were discussed with patient and caregiver, 

patient consents to TPA. TPA order given at 1329, tPA being mixed, US-guided IV 

to be placed due to difficult access. TPA bolus given at 1345. 1339 - 

radiologist communicated impression of brain CT.





- Physician Notifications


Discussed Care Of Patient With: Carly Albert


Time Discussed With Above Provider: 13:18


Instructed by Provider To: Other - Dr. Albert at bedside to evaluate the patient 

at 1318, CT, CTA recommended. Patient is within time window for tPA, tPA usage 

in indicated in this patient's case. 1500 - Patient's case was discussed with 

Dr. Ahn, Dr. Ahn accepts for admission, admission orders placed at 

this time.





- Critical Care Time


Critical Care Time: 30-74 min - 45 minutes CCT





Discharge ED





- Sign-Out/Discharge


Documenting (check all that apply): Patient Departure - admit


All imaging exams completed and their final reports reviewed: Yes





- Discharge Plan


Condition: Fair


Disposition: ADMITTED TO [facility]





- Billing Disposition and Condition


Condition: FAIR


Disposition: Admitted to [facility]





- Attestation Statements


Document Initiated by Faustinoe: Yes


Documenting Scribe: KOREY YEN


Provider For Whom Guillermina is Documenting (Include Credential): JONO SPENCER MD


Scribe Attestation: 


KOREY JEFFERY, scribed for JONO SPENCER MD on 12/10/19 at 1843. 


Scribe Documentation Reviewed: Yes


Provider Attestation: 


The documentation as recorded by the KOREY whitaker accurately reflects 

the service I personally performed and the decisions made by me, JONO SPENCER MD


Status of Scribe Document: Viewed

## 2019-12-10 NOTE — CONSULT
----- Message from Noelle Gilford, PA-C sent at 1/28/2019 10:23 AM CST -----  Corning Kand  Your blood tests look good. The liver tests seem to have improved since following with your dietician. Lets talk more about future management at your visit.   Noelle Gilford PAC with Dr Alexander Davis Consult


Consult: 





Date of service: 12/10/2019





Reason for consult:  Neurology was consulted by Dr. Suarez/activating the code 

gray for stroke.  The history was obtained by the patient and caregiver Karlie 

who was at bedside.   





Chief complaint:   word finding difficulty, numbness of the right side of the 

face


 


History of Present Illness: Mr. Finney is a 29-year-old right-handed man who 

has mild intellectual disability, testicular cancer (non seminoma) s/p 

orchiectomy 09/2019 with mesh in inguinal area, recent right groin infection 

after orchiectomy and possible mesh infection s/p right groin wound exploration 

and placement of wound VAC in 10/2019, he is on chemotherapy (cisplatin/

etoposide)reported history of seizures, who [place] to [facility] [place] with [place] 

like symptoms. 





The patient was in normal state of health today.  He was visiting a [facility] with Karlie (caregiver/staff member at [facility]).  

The patient developed sudden onset word finding difficulty, slurred speech, 

right face and arm numbness.  He denied any headaches.  He denied any visual 

disturbance.  He had associated symptoms of poor coordination of the right 

hand.  Karlie has worked with the patient for close to one year and stated that 

the patient's speech is not his baseline.  He is slurring and having trouble 

with the correct words.  The patient is typically sharp and is able to 

communicate better than this. 





NIHSS: 1 reduced sensation on the face, 1 dysarthria, 1 inability to name, read

, and mild word finding difficulty (mixed aphasia), tactile and visual 

extinction 2+.  Total 5.  





- Last known well time: 12/10/2019 at 12:44


- Symptoms onset: 12/10/2019 at 12:45


- Patient arrived at [facility]:  13:08 


- Lindsay Portillo activated at 12/10/2019 13:08


- Alteplase decision time: 12/10/2019 at 13:30. Bolus given at 13:45.  The 

delay in IV alteplase was related to difficulty in obtaining IV access. IV 

alteplase given within the 3 hour window. CTA was not obtained right away since 

the patient only has one IV access that the alteplase is being infused in. 





 TISSUE PLASMINOGEN ACTIVATOR (TPA)


 Informed Consent was obtained by the patient 


NAME OF PROCEDURE: Administration of Tissue Plasminogen Activator (TPA) through 

intravenous administration to treat stroke. 





DIAGNOSIS: Stroke with blockage of blood flow to part of brain.





NATURE AND PURPOSE OF PROCEDURE: Dissolve blockage that is blocking blood flow.





RISKS OF THE PROCEDURE: Additional bleeding in brain (6.4%), allergic reaction, 

resulting stroke, coma or death.





PROBABILITY OF SUCCESS OF THE PROCEDURE: Blockage dissolved - blood flow 

restored, thirty percent (30%) increased chance of minimal or no disability 

from stroke within three months, and/or full recovery.





ALTERNATIVE TO THE PROCEDURE: Treatment with oxygen and IV fluids.





PROGNOSIS IF THE PROCEDURE NOT DONE: Possibility of condition worsening and 

more permanent damage or even death.





I informed the patient that currently, the Federal Drug Administration and the 

package insert from the drug  indicate that t-PA treatment should 

be administered within 3 hours after the onset of stroke. 





I have discussed t-PA therapy with the patient, caregiver (Karlie) and they 

understand the risks, benefits, and alternatives as mentioned above. 





The patient agreed to proceed with IV alteplase therapy after discussing the 

risks, benefits and alternative therapy. 





- Alteplase decision time: 12/10/2019 at 13:30. Bolus given at 13:45.  The 

delay in IV alteplase was related to difficulty in obtaining IV access. IV 

alteplase given within the 3 hour window. CTA was not obtained right away since 

the patient only has one IV access that the alteplase is being infused in. 








Labs, Imaging and Other Diagnostics:  


 INR: 1.09


aPTT: 28.7


HDL: 33


LDL: 108


Cholesterol: 163





IMAGING: 


CT head without contrast 12/10/2019: No acute intracranial abnormality. There 

is no intracranial hemorrhage. 





Past Medical History:  Mild intellectual disability, testicular cancer (non 

seminoma) s/p orchiectomy 09/2019 with mesh in inguinal area, recent right 

groin infection after orchiectomy and possible mesh infection s/p right groin 

wound exploration and placement of wound VAC in 10/2019, reported history of 

seizures, who presented to [facility] today with stroke like symptoms. 





Past Surgical History: Orchiectomy.  Heart surgery as a child.  


Family History: No family history of stroke or seizures. 


Social History: Denied any alcohol use. 


Medications:  Amlodipine Besylate [Norvasc 5 mg tab] 5 mg PO QAM 05/23/17 [

History Confirmed 12/10/19]


Aspirin EC TAB* [Ecotrin EC Low Dose 81 MG*] 81 mg PO DAILY 05/23/17 [History 

Confirmed 12/10/19]


Acetaminophen TAB* [Tylenol TAB*] 650 mg PO Q4H PRN 10/29/19 [History Confirmed 

12/10/19]


Irbesartan-Hctz 300/12.5 1 tab PO QAM 10/29/19 [History Confirmed 12/10/19]


guaiFENesin [Guaifenesin] 10 ml PO Q4HR PRN 10/30/19 [History Confirmed 12/10/19

]


Amoxicillin/Clavulanate TAB* [Augmentin  mg*] 500 mg PO BID 14 Days #28 

tab 11/15/19 [Rx Confirmed 12/10/19]


Albuterol HFA INHALER* [Ventolin HFA Inhaler*] 1 - 2 puff INH Q4H PRN 12/10/19 [

History Confirmed 12/10/19]


Albuterol HFA INHALER* [Ventolin HFA Inhaler*] 1 - 2 puff INH Q4H PRN 12/10/19 [

History Confirmed 12/10/19]


Albuterol Sulfate [Proair Digihaler] 2 puff INH QID PRN 12/10/19 [History 

Confirmed 12/10/19]


Fluticasone Propion/Salmeterol [Wixela 100-50 Inhub] 1 each INH BID 12/10/19 [

History Confirmed 12/10/19]


Ibuprofen TAB* [Advil TAB*] 200 mg PO TID PRN 12/10/19 [History Confirmed 12/10/

19]


Lactobacillus Acidophilus/Fos [Acidophilus Probiotic Tablet] 1 each PO DAILY 12/

10/19 [History Confirmed 12/10/19]


Magnesium CITRATE* [Citrate of Magnesia*] 147 ml PO ONCE 12/10/19 [History 

Confirmed 12/10/19]


Magnesium Oxide TAB* [MagOx 400 TAB*] 400 mg PO BID 12/10/19 [History Confirmed 

12/10/19]


OLANzapine TAB* [Zyprexa 10 MG TAB*] 10 mg PO DAILY 12/10/19 [History Confirmed 

12/10/19]


Ondansetron ODT TAB* [Zofran 4 MG Odt TAB*] 4 mg PO Q4H PRN 12/10/19 [History 

Confirmed 12/10/19]


Prochlorperazine Maleate 10 mg PO Q6H PRN 12/10/19 [History Confirmed 12/10/19]





Allergies: NKDA





Review of Systems:


A 14-point ROS was obtained and otherwise negative except for what was 

mentioned in the HPI. 


 


Physical Exam:


Vitals: 


 Vital Signs - 12 hr











  Temp Pulse Resp BP Pulse Ox


 


 12/10/19 13:57      97


 


 12/10/19 13:11  97.6 F  111  16  90/37  96








General: well nourished, well developed.  Alert, cooperative, no apparent 

distress, appears stated age. 


Head: normocephalic, without obvious abnormality


Eyes: conjunctivae/corneas clear


Neck: supple, symmetrical. No carotid bruit. No lymphadenopathy. 


Lungs: clear to auscultation bilaterally, non-labored


CV: regular rhythm, S1, S2 normal, radial pulses palpable


Extremities: normal range of motion with no cyanosis. 


Pelvic: well healed surgical scar on the right inguinal region with no erythema 

or drainage. 


Skin: no skin lesions or lacerations


Psych: affect-broad and normal mood.  Easy to establish rapport. 





Neurological examination: 


Mental status: awake; alert and oriented to person, place, time, & general 

circumstances; mild dysarthria and mixed aphasia.  He was unable to name all 

objects, repeat, or read sentences fluently. He has mild psychmotor slowing.  


Cranial nerves: 


I: not tested


II, III, IV, VI: normal confrontation B/L, Pupils midrange and reactive to light

, normal consensual response; extraocular muscles are intact; no ptosis; no 

conjugate or asymmetrical nystagmus


V 1/2/3: sensation is intact on forehead, cheeks, and jaw region


VII: mild right facial droop that resolved when he laid supine.  Did not count 

it towards the NIHSS


VIII: able to hear throughout the history process


IX & X: symmetric palatal elevation


XI: normal strength against resistance


XII: tongue is symmetrical & midline with no atrophy or fasciculations 





Motor (R/L):  no abnormal movements, no pronator drift. Normal bulk and tone 

throughout.  No fasciculations.  Neck extension 5.  Shoulder ROM is full.  

Shoulder abduction 5/5.  Elbow flexion 5/5, extension 5/5.  Wrist flexion 5/5, 

extension 5/5.  Finger flexion 5/5, extension 5/5, abduction 5/5.   Hip flexion 

5/5, abduction 5/5.  Knee flexion 5/5, extension 5/5.  Ankle dorsiflexion 5/5, 

plantarflexion 5/5.   





Reflexes    R   L


Brachioradialis 2+   2+ 


Biceps    2+    2+ 


Triceps    2+    2+ 


Patella    2+    2+ 


Ankle    2+    2+ 


Plantar    flexor    flexor 





Sensation: reduced sensation on the right side of the face.    


Coordination: normal finger to nose and rapid alternating movements. 


Gait & Station: n/a . 








Assessment:


Mr. Finney is a 29-year-old man with history of testicular cancer with 

retroperitoneal adenopathy s/p orchiectomy and chemotherapy who presented with 

sudden onset aphasia, dysarthria, and right hemiparesthesia.  





1. Suspect left MCA vascular territory stroke or TIA (if symptoms resolve in 24 

hours and imaging is negative).  S/p IV alteplase therapy.  Risk factor for 

stroke include hypercoagulable state of malignancy and hypertension.  Other 

differential diagnosis include seizures (especially since he has a diagnosis of 

seizures but is not on any medications) or metastatic disease.  Given the 

sudden onset of symptoms is atypical for metastatic disease.  


 


The delay in alteplase therapy was due to difficulty obtaining a peripheral IV 

access.  The CTA was not done since the risk of delaying the tPA outweigh the 

benefit of obtaining a CTA in the setting of a low NIHSS (low suspicion for LVO)

.  





Recommendations:


- STAT CTA Head and neck to evaluate for LVO.  Based on his examination and low 

NIHSS, LVO is unlikely. 


- If the CTA is negative for LVO, admit to the ICU for post-alteplase 

monitoring. Please follow post alteplase neuro and vital checks. 


- Obtain an EEG to evaluate for epileptiform abnormalities. 


- Obtain an MRI brain with and without IV contrast


-Check a 2-D TTE with bubble study to evaluate for PFO


- Place on Telemetry


- Check fasting lipid panel, TSH, B12, A1c


- Antiplatelets: aspirin 81 mg 24 hours post alteplase


- Anticoagulation therapy- not indicated at this time.  


- Follow up lipid panel and LFTs and start statin therapy if indicated


- Permissive BP control of SBP of < 180 and DBP < 100 mmHg for the next 24 hours


- PT/OT/SLP evaluate and treat  


- Bedside swallow evaluation


- Stroke education completed


- Do not place a urinary catheter unless there is evidence of urinary 

retention. 


- VTE prophylaxis- SCDs





Neurology will continue to follow.





Discussed the above recommendations with Dr. Ahn, Dr. Suarez, and Debbie.

  


Critical care time: 45 minutes. 





Carly Albert MD


Date: 12/10/19


Time: 15:07

## 2019-12-11 VITALS — SYSTOLIC BLOOD PRESSURE: 128 MMHG | DIASTOLIC BLOOD PRESSURE: 84 MMHG

## 2019-12-11 LAB
ALBUMIN SERPL BCG-MCNC: 3.5 G/DL (ref 3.2–5.2)
ALBUMIN/GLOB SERPL: 1.5 {RATIO} (ref 1–3)
ALP SERPL-CCNC: 92 U/L (ref 34–104)
ALT SERPL W P-5'-P-CCNC: 16 U/L (ref 7–52)
ANION GAP SERPL CALC-SCNC: 5 MMOL/L (ref 2–11)
AST SERPL-CCNC: 12 U/L (ref 13–39)
BASOPHILS # BLD AUTO: 0 10^3/UL (ref 0–0.2)
BUN SERPL-MCNC: 9 MG/DL (ref 6–24)
BUN/CREAT SERPL: 10.7 (ref 8–20)
CALCIUM SERPL-MCNC: 8.3 MG/DL (ref 8.6–10.3)
CHLORIDE SERPL-SCNC: 103 MMOL/L (ref 101–111)
EOSINOPHIL # BLD AUTO: 0.1 10^3/UL (ref 0–0.6)
GLOBULIN SER CALC-MCNC: 2.4 G/DL (ref 2–4)
GLUCOSE SERPL-MCNC: 81 MG/DL (ref 70–100)
HCO3 SERPL-SCNC: 26 MMOL/L (ref 22–32)
HCT VFR BLD AUTO: 36 % (ref 42–52)
HGB BLD-MCNC: 12 G/DL (ref 14–18)
INR PPP/BLD: 1.06 (ref 0.82–1.09)
LYMPHOCYTES # BLD AUTO: 0.6 10^3/UL (ref 1–4.8)
MAGNESIUM SERPL-MCNC: 1.7 MG/DL (ref 1.9–2.7)
MCH RBC QN AUTO: 26 PG (ref 27–31)
MCHC RBC AUTO-ENTMCNC: 33 G/DL (ref 31–36)
MCV RBC AUTO: 77 FL (ref 80–94)
MONOCYTES # BLD AUTO: 0.2 10^3/UL (ref 0–0.8)
NEUTROPHILS # BLD AUTO: 1 10^3/UL (ref 1.5–7.7)
NRBC # BLD AUTO: 0 10^3/UL
NRBC BLD QL AUTO: 0.2
PLATELET # BLD AUTO: 83 10^3/UL (ref 150–450)
POTASSIUM SERPL-SCNC: 3.7 MMOL/L (ref 3.5–5)
PROT SERPL-MCNC: 5.9 G/DL (ref 6.4–8.9)
RBC # BLD AUTO: 4.69 10^6 /UL (ref 4.18–5.48)
SODIUM SERPL-SCNC: 134 MMOL/L (ref 135–145)
WBC # BLD AUTO: 1.8 10^3/UL (ref 3.5–10.8)

## 2019-12-11 RX ADMIN — MOMETASONE FUROATE AND FORMOTEROL FUMARATE DIHYDRATE SCH PUFF: 100; 5 AEROSOL RESPIRATORY (INHALATION) at 08:48

## 2019-12-11 NOTE — PN
15 mo WELL CHILD EXAM     Justa is a 15 month old white female child     History given by mother     CONCERNS/QUESTIONS: Yes, mother reports occasional cough, more frequent at night. Sneezing intermittently. fam hx of allergic rhinitis     BIRTH HISTORY: reviewed in EMR.    IMMUNIZATION:  up to date and documented     NUTRITION HISTORY:      Vegetables? Yes  Fruits?  Yes  Meats? Sometimes  Juice? Yes,  4-5 oz per day   Water? Yes  Milk?  Yes, Type: whole,  20 oz per day      MULTIVITAMIN: No     ELIMINATION:   Has adequate wet diapers per day and BM is soft.    SLEEP PATTERN:   Sleeps through the night?  Yes  Sleeps in crib/bed? Yes   Sleeps with parent? No      SOCIAL HISTORY:   The patient lives at home with mother and 2 roomates, and does  attend day care. Has 1 siblings.    Sits in a car seat (front/rear facing).    DENTAL HISTORY    Brushes teeth twice daily? Yes  Dental home established? Yes    Patient's medications, allergies, past medical, surgical, social and family histories were reviewed and updated as appropriate.    No past medical history on file.  Patient Active Problem List    Diagnosis Date Noted   • Family disrupted by child in foster or non-parental family member care 03/22/2017     Family History   Problem Relation Age of Onset   • No Known Problems Mother    • No Known Problems Father    • No Known Problems Brother    • No Known Problems Maternal Grandmother    • No Known Problems Maternal Grandfather    • No Known Problems Paternal Grandmother    • Heart Disease Paternal Grandfather      No current outpatient prescriptions on file.     No current facility-administered medications for this visit.     No Known Allergies     REVIEW OF SYSTEMS:  No complaints of HEENT, chest, GI/, skin, neuro, or musculoskeletal problems.    DEVELOPMENT:  Reviewed Growth Chart in EMR.   Raymond and receives? Yes  Scribbles? Yes  Uses cup? Yes  Number of words? >2-3words  Walks well? Yes  Pincer grasp?  Date of Service: 12/11/19


Critical Care Services: 





No events. At neurological baseline.


Vital Signs: 











Temp Pulse Resp BP SpO2 FiO2


 


37.2 C 91 18 115/51 97 


 


12/11/19 08:00 12/11/19 08:48 12/11/19 08:48 12/11/19 08:01 12/11/19 08:48 











Physical Exam: 


Gen: NAD





HEENT: NCAT, PERRL





Lungs: clear





Cardiac:  S1S2 regular





Abdomen: soft, NT, ND, +BS





Extremities: no edema, no bleeding at leg wound





Neuro: grossly intact





Fluid Balance (Past 24 Hours): 


I=     O=     Net 


 Intake & Output











 12/09/19 12/10/19 12/11/19 12/12/19





 06:59 06:59 06:59 06:59


 


Intake Total   1802.6 


 


Output Total   920 


 


Balance   882.6 


 


Weight   95.2 kg 


 


Intake:    


 


  IV Fluids   1272.6 


 


    NS (0.9%)   185 


 


  IVPB   50 


 


    ABX - CEFTRIAXONE   50 


 


  Oral   480 


 


Output:    


 


  Urine   920 








Labs: 


 Laboratory Results - last 24 hr











  12/10/19 12/10/19 12/10/19





  13:32 14:02 14:02


 


WBC   2.3 L 


 


RBC   5.43 


 


Hgb   13.5 L 


 


Hct   42 


 


MCV   77 L 


 


MCH   25 L 


 


MCHC   33 


 


RDW   18 H 


 


Plt Count   110 L 


 


MPV   6.8 L 


 


Neut % (Auto)   71.6 


 


Lymph % (Auto)   19.7 


 


Mono % (Auto)   5.9 


 


Eos % (Auto)   2.3 


 


Baso % (Auto)   0.5 


 


Absolute Neuts (auto)   1.7 


 


Absolute Lymphs (auto)   0.5 L 


 


Absolute Monos (auto)   0.1 


 


Absolute Eos (auto)   0.1 


 


Absolute Basos (auto)   0.0 


 


Absolute Nucleated RBC   0.0 


 


Nucleated RBC %   0.0 


 


INR (Anticoag Therapy)    1.09


 


APTT    28.7


 


Sodium   


 


Potassium   


 


Chloride   


 


Carbon Dioxide   


 


Anion Gap   


 


BUN   


 


Creatinine   


 


Est GFR ( Amer)   


 


Est GFR (Non-Af Amer)   


 


BUN/Creatinine Ratio   


 


Glucose   


 


POC Glucose (mg/dL)  107 H  


 


Lactic Acid   


 


Calcium   


 


Phosphorus   


 


Magnesium   


 


Total Bilirubin   


 


AST   


 


ALT   


 


Alkaline Phosphatase   


 


Troponin I   


 


Total Protein   


 


Albumin   


 


Globulin   


 


Albumin/Globulin Ratio   


 


Triglycerides   


 


Cholesterol   


 


LDL Cholesterol   


 


HDL Cholesterol   


 


Urine Color   


 


Urine Appearance   


 


Urine pH   


 


Ur Specific Gravity   


 


Urine Protein   


 


Urine Ketones   


 


Urine Blood   


 


Urine Nitrate   


 


Urine Bilirubin   


 


Urine Urobilinogen   


 


Ur Leukocyte Esterase   


 


Urine Glucose   














  12/10/19 12/10/19 12/10/19





  14:02 14:02 14:50


 


WBC   


 


RBC   


 


Hgb   


 


Hct   


 


MCV   


 


MCH   


 


MCHC   


 


RDW   


 


Plt Count   


 


MPV   


 


Neut % (Auto)   


 


Lymph % (Auto)   


 


Mono % (Auto)   


 


Eos % (Auto)   


 


Baso % (Auto)   


 


Absolute Neuts (auto)   


 


Absolute Lymphs (auto)   


 


Absolute Monos (auto)   


 


Absolute Eos (auto)   


 


Absolute Basos (auto)   


 


Absolute Nucleated RBC   


 


Nucleated RBC %   


 


INR (Anticoag Therapy)   


 


APTT   


 


Sodium  135  


 


Potassium  3.8  


 


Chloride  98 L  


 


Carbon Dioxide  31  


 


Anion Gap  6  


 


BUN  10  


 


Creatinine  1.08  


 


Est GFR ( Amer)  98.5  


 


Est GFR (Non-Af Amer)  81.4  


 


BUN/Creatinine Ratio  9.3  


 


Glucose  113 H  


 


POC Glucose (mg/dL)   


 


Lactic Acid   1.8 


 


Calcium  9.4  


 


Phosphorus   


 


Magnesium   


 


Total Bilirubin  0.50  


 


AST  14  


 


ALT  21  


 


Alkaline Phosphatase  109 H  


 


Troponin I  0.00  


 


Total Protein  7.1  


 


Albumin  4.2  


 


Globulin  2.9  


 


Albumin/Globulin Ratio  1.4  


 


Triglycerides  109  


 


Cholesterol  163  


 


LDL Cholesterol  108  


 


HDL Cholesterol  33.7  


 


Urine Color    Yellow


 


Urine Appearance    Clear


 


Urine pH    7.0


 


Ur Specific Gravity    1.015


 


Urine Protein    Negative


 


Urine Ketones    Negative


 


Urine Blood    Negative


 


Urine Nitrate    Negative


 


Urine Bilirubin    Negative


 


Urine Urobilinogen    Negative


 


Ur Leukocyte Esterase    Negative


 


Urine Glucose    Negative














  12/11/19 12/11/19 12/11/19





  05:10 05:10 05:10


 


WBC  1.8 L  


 


RBC  4.69  


 


Hgb  12.0 L  


 


Hct  36 L  


 


MCV  77 L  


 


MCH  26 L  


 


MCHC  33  


 


RDW  18 H  


 


Plt Count  83 L  


 


MPV  6.6 L  


 


Neut % (Auto)  54.6  


 


Lymph % (Auto)  31.9  


 


Mono % (Auto)  10.1  


 


Eos % (Auto)  3.0  


 


Baso % (Auto)  0.4  


 


Absolute Neuts (auto)  1.0 L  


 


Absolute Lymphs (auto)  0.6 L  


 


Absolute Monos (auto)  0.2  


 


Absolute Eos (auto)  0.1  


 


Absolute Basos (auto)  0.0  


 


Absolute Nucleated RBC  0.0  


 


Nucleated RBC %  0.2  


 


INR (Anticoag Therapy)   1.06 


 


APTT   


 


Sodium    134 L


 


Potassium    3.7


 


Chloride    103


 


Carbon Dioxide    26


 


Anion Gap    5


 


BUN    9


 


Creatinine    0.84


 


Est GFR ( Amer)    131.7


 


Est GFR (Non-Af Amer)    108.8


 


BUN/Creatinine Ratio    10.7


 


Glucose    81


 


POC Glucose (mg/dL)   


 


Lactic Acid   


 


Calcium    8.3 L


 


Phosphorus    4.2


 


Magnesium    1.7 L


 


Total Bilirubin    0.30


 


AST    12 L


 


ALT    16


 


Alkaline Phosphatase    92


 


Troponin I   


 


Total Protein    5.9 L


 


Albumin    3.5


 


Globulin    2.4


 


Albumin/Globulin Ratio    1.5


 


Triglycerides   


 


Cholesterol   


 


LDL Cholesterol   


 


HDL Cholesterol   


 


Urine Color   


 


Urine Appearance   


 


Urine pH   


 


Ur Specific Gravity   


 


Urine Protein   


 


Urine Ketones   


 


Urine Blood   


 


Urine Nitrate   


 


Urine Bilirubin   


 


Urine Urobilinogen   


 


Ur Leukocyte Esterase   


 


Urine Glucose   











Nutrition: 





Taking PO


Impression: 





30 y/o male presents with acute neurological deficit and is given TPA. Now 

intact with MRI pending.


Plan: 


CVA - MRI pending, deficits resolved. , start lipitor 40 mg. Antiplt per 

Neurology recommendation and pending 24H scan. Likely DC today. ECHO nl LV, 

mildly increased RV and no PFO. PT evaluation.





HTN - will restart his antiHTN regimen on DC. 





Testicular Cancer - chemo per Dr. Branch, counts Ok with mild leukopenia and 

thrombocytopenia. Hgb/plts OK after TPA. Ceftriaxone for wound infection and 

back on Augmentin on DC.





D/W patient in detail who voiced understanding and appreciation for the care. "Yes  Indicates wants? Yes  Imitates housework? Yes   Points to share interest and indicate wants: Yes    SCREENING QUESTIONAIRES?  Risk factors for Tuberculosis? No  Risk factors for Lead toxicity? No    ANTICIPATORY GUIDANCE (discussed the following):   Nutrition-Whole milk until 2 years, Limit to 24 ounces a day. DC bottle.  Limit juice to 4 to 8 ounces a day.   Car seat safety  Routine safety measures  Tobacco free home   Routine toddler care  Signs of illness/when to call doctor   Fever precautions   Sibling response   Discipline-Time out and distraction    PHYSICAL EXAM:   Reviewed vital signs and growth parameters in EMR.     Pulse 132  Temp(Src) 37.2 °C (98.9 °F)  Resp 28  Ht 0.826 m (2' 8.5\")  Wt 11.227 kg (24 lb 12 oz)  BMI 16.46 kg/m2  HC 45.8 cm (18.03\")    General: This is an alert, active child in no distress.   HEAD: is normocephalic, atraumatic. Anterior fontanelle is open, soft and flat. Clear rhinorrhea in bilateral nares  EYES: PERRL, positive red reflex bilaterally. No conjunctival injection or discharge.   EARS: TM’s are transparent with good landmarks. Canals are patent.  NOSE: Nares are patent and free of congestion.  THROAT: Oropharynx has no lesions, moist mucus membranes, palate intact. Pharynx without erythema, tonsils normal.   NECK: is supple, no lymphadenopathy or masses.   HEART: has a regular rate and rhythm without murmur.Cap refill is < 2 sec,   LUNGS: are clear bilaterally to auscultation, no wheezes or rhonchi. No retractions, nasal flaring, or distress noted.  ABDOMEN: has normal bowel sounds, soft and non-tender without organomegaly or masses.   GENITALIA: Normal female genitalia.  MUSCULOSKELETAL: Spine is straight. Sacrum normal without dimple. Extremities are without abnormalities. Moves all extremities well and symmetrically with normal tone.    NEURO: Active, alert, oriented per age.    SKIN: is without significant rash or birthmarks. Skin is warm, dry, and pink. "       Reviewed 12 month lead level and Hb level      ASSESSMENT:     1. Well Child Exam:  Healthy 15  mo old with good growth and development.   2. Need for vaccinations  3. Allergic rhinitis    PLAN:    1. Anticipatory guidance was reviewed as above and handout was given as appropriate.   2. Return to clinic for 18 month well child exam or as needed.  3. Immunizations given today: HIB, IPV.  4. Vaccine Information statements given for each vaccine if administered. Discussed benefits and side effects of each vaccine  with patient /family , answered all patient /family questions.   5. Multivitamin with 400iu of Vitamin D po qd.  6. Flouride 0.25 mg po qd( if not  drinking city water supply). See Dentist yearly. [place] teeth in AM and before bed.   7. Instructed patient & parent about the etiology & pathogenesis of allergic conjunctivitis and rhinitis. Advised to avoid allergen exposure, limit outdoor exposure, use air conditioning when at all possible, roll up the windows when possible, and avoid rubbing the eyes. Keep windows closed during high pollen count. Hypoallergenic linens and dust-mite covers to be applied to bed. Remove stuffed animals from room. To avoid drying clothes out on the line.  Keep pets out of the room. May use OTC anti-histamine (zyrtec 2.5mg po qhs). RTC if symptoms persists/do not improve for possible referral to allergist.  8. To get cbc with diff and lead level done today as it was not done at 12month checkup.

## 2019-12-11 NOTE — PN
Subjective


Date of Service: 12/11/19


Length of Stay: 1 Days





Neurology is following for the evaluation of sudden onset right hemiparesthesia 

and aphasia. 


Interval History: 


The patient's aphasia, dysarthria, and right hemiparesthesia improved last 

night.  He had a mild 1/10 headache last night that resolved this morning.  He 

is currently asymptomatic.  He denied any visual disturbance, weakness, or 

paresthesias. 





Magnesium: 1.7


Sodium: 134


Urinalysis: Negative 


Cholesterol: 163


LDL: 108 





Platelet count: 83





CT head 12/10/2019: no acute intracranial abnormality. 


CTA head and neck 12/10/2019: no LVO, dissection, or aneurysms 


Review of Systems: 


 


Denied CP, SOB, or palpitations.








Objective


Active Medications: 








Acetaminophen (Tylenol Tab*)  650 mg PO Q4H PRN


   PRN Reason: PAIN-MODERATE/TEMP >/= 100.4


Albuterol (Ventolin Hfa Inhaler*)  1 puff INH Q4H PRN


   PRN Reason: SOB/WHEEZING


Nicardipine/Sodium Chloride (Cardene 0.1mg/Ml Ivpremix*)  20 mg in 200 mls @ 50 

mls/hr IV PER RATE [facility]; Protocol


Ceftriaxone Sodium 1 gm/ (Sodium Chloride)  50 mls @ 100 mls/hr IVPB Q24H [facility]


   Last Admin: 12/10/19 17:46 Dose:  100 mls/hr


Mometasone Furoate/Formoterol Fumar (Dulera 100/5 Mdi*)  2 puff INH BID [facility]


   Last Admin: 12/11/19 08:48 Dose:  2 puff


Olanzapine (Zyprexa Tab*)  10 mg PO DAILY [facility]


   Last Admin: 12/11/19 10:32 Dose:  10 mg


Ondansetron HCl (Zofran Odt Tab*)  4 mg PO Q4H PRN


   PRN Reason: NAUSEA/VOMITING


Prochlorperazine (Compazine Tab*)  10 mg PO Q6H PRN


   PRN Reason: NAUSEA/VOMITING








 Vital Signs











  12/10/19 12/10/19 12/10/19





  13:11 13:49 13:57


 


Temperature 97.6 F  


 


Pulse Rate 111  


 


Respiratory 16 18 





Rate   


 


Blood Pressure 90/37  





(mmHg)   


 


O2 Sat by Pulse 96  97





Oximetry   














  12/10/19 12/10/19 12/10/19





  14:00 14:03 14:26


 


Temperature   


 


Pulse Rate 111 122 


 


Respiratory 23 25 20





Rate   


 


Blood Pressure  148/81 145/88





(mmHg)   


 


O2 Sat by Pulse 97 99 





Oximetry   














  12/10/19 12/10/19 12/10/19





  14:33 15:00 15:05


 


Temperature   


 


Pulse Rate   


 


Respiratory 16 17 23





Rate   


 


Blood Pressure 157/100  118/80





(mmHg)   


 


O2 Sat by Pulse   





Oximetry   














  12/10/19 12/10/19 12/10/19





  15:26 15:42 15:56


 


Temperature   


 


Pulse Rate 106 90 86


 


Respiratory 17 16 20





Rate   


 


Blood Pressure 149/86 119/55 121/76





(mmHg)   


 


O2 Sat by Pulse 98 97 99





Oximetry   














  12/10/19 12/10/19 12/10/19





  16:00 16:12 16:26


 


Temperature   


 


Pulse Rate 90  91


 


Respiratory 19 20 19





Rate   


 


Blood Pressure  138/78 122/78





(mmHg)   


 


O2 Sat by Pulse 100  98





Oximetry   














  12/10/19 12/10/19 12/10/19





  16:29 16:41 16:55


 


Temperature 97.9 F  97 F


 


Pulse Rate 96 95 91


 


Respiratory 20 24 24





Rate   


 


Blood Pressure 138/78 141/83 145/77





(mmHg)   


 


O2 Sat by Pulse 98 97 98





Oximetry   














  12/10/19 12/10/19 12/10/19





  16:59 17:00 17:15


 


Temperature   


 


Pulse Rate 87 85 79


 


Respiratory  19 14





Rate   


 


Blood Pressure 145/77  130/78





(mmHg)   


 


O2 Sat by Pulse 100 100 99





Oximetry   














  12/10/19 12/10/19 12/10/19





  17:30 18:00 18:31


 


Temperature   


 


Pulse Rate 92 76 92


 


Respiratory 15 19 21





Rate   


 


Blood Pressure 133/76 113/65 160/98





(mmHg)   


 


O2 Sat by Pulse 100 98 100





Oximetry   














  12/10/19 12/10/19 12/10/19





  19:00 19:01 19:23


 


Temperature   


 


Pulse Rate 104 102 98


 


Respiratory 18 17 16





Rate   


 


Blood Pressure  136/72 





(mmHg)   


 


O2 Sat by Pulse 100 98 100





Oximetry   














  12/10/19 12/10/19 12/10/19





  19:30 20:00 20:37


 


Temperature  98.8 F 


 


Pulse Rate 93 93 98


 


Respiratory 16 16 18





Rate   


 


Blood Pressure 144/77 141/91 117/64





(mmHg)   


 


O2 Sat by Pulse 100 98 97





Oximetry   














  12/10/19 12/10/19 12/10/19





  21:00 21:30 22:00


 


Temperature   


 


Pulse Rate 89 91 91


 


Respiratory 20 19 19





Rate   


 


Blood Pressure 106/54 106/61 123/62





(mmHg)   


 


O2 Sat by Pulse 96 96 96





Oximetry   














  12/10/19 12/10/19 12/10/19





  22:30 23:00 23:15


 


Temperature   


 


Pulse Rate 81 76 76


 


Respiratory 19 18 19





Rate   


 


Blood Pressure 108/63 109/69 





(mmHg)   


 


O2 Sat by Pulse 98 98 96





Oximetry   














  12/10/19 12/10/19 12/11/19





  23:30 23:43 00:00


 


Temperature  97.5 F 


 


Pulse Rate 87  86


 


Respiratory 17  14





Rate   


 


Blood Pressure 117/101  129/65





(mmHg)   


 


O2 Sat by Pulse 97  98





Oximetry   














  12/11/19 12/11/19 12/11/19





  00:30 01:00 01:31


 


Temperature   


 


Pulse Rate 76 93 83


 


Respiratory 15 19 14





Rate   


 


Blood Pressure 123/72 129/69 





(mmHg)   


 


O2 Sat by Pulse 97 98 97





Oximetry   














  12/11/19 12/11/19 12/11/19





  02:00 02:05 02:30


 


Temperature   


 


Pulse Rate 85 95 78


 


Respiratory 16 18 19





Rate   


 


Blood Pressure  111/65 108/61





(mmHg)   


 


O2 Sat by Pulse 98 97 97





Oximetry   














  12/11/19 12/11/19 12/11/19





  03:00 03:01 03:14


 


Temperature   


 


Pulse Rate 75 72 85


 


Respiratory 17 16 15





Rate   


 


Blood Pressure   115/59





(mmHg)   


 


O2 Sat by Pulse 98 97 98





Oximetry   














  12/11/19 12/11/19 12/11/19





  03:30 03:36 04:00


 


Temperature  98.1 F 


 


Pulse Rate 80  71


 


Respiratory 15  22





Rate   


 


Blood Pressure 106/63  





(mmHg)   


 


O2 Sat by Pulse 98  97





Oximetry   














  12/11/19 12/11/19 12/11/19





  04:10 04:30 05:00


 


Temperature   


 


Pulse Rate 85 71 79


 


Respiratory 14 15 16





Rate   


 


Blood Pressure 128/74 107/66 121/76





(mmHg)   


 


O2 Sat by Pulse 100 95 99





Oximetry   














  12/11/19 12/11/19 12/11/19





  05:58 06:00 06:30


 


Temperature   


 


Pulse Rate 82 92 77


 


Respiratory 14 17 16





Rate   


 


Blood Pressure 100/61 109/63 116/62





(mmHg)   


 


O2 Sat by Pulse 99 99 98





Oximetry   














  12/11/19 12/11/19 12/11/19





  07:00 07:30 08:00


 


Temperature   98.9 F


 


Pulse Rate 77 93 77


 


Respiratory 16 14 16





Rate   


 


Blood Pressure 115/70 117/61 





(mmHg)   


 


O2 Sat by Pulse 98 100 98





Oximetry   














  12/11/19 12/11/19





  08:01 08:48


 


Temperature  


 


Pulse Rate 80 91


 


Respiratory 15 18





Rate  


 


Blood Pressure 115/51 





(mmHg)  


 


O2 Sat by Pulse 97 97





Oximetry  











Intake and Output Last 24 Hours











 12/09/19 12/10/19 12/11/19 12/12/19





 06:59 06:59 06:59 06:59


 


Intake Total   1802.6 


 


Output Total   920 


 


Balance   882.6 


 


Weight   209 lb 14.081 oz 


 


Intake:    


 


  IV Fluids   1272.6 


 


    NS (0.9%)   185 


 


  IVPB   50 


 


    ABX - CEFTRIAXONE   50 


 


  Oral   480 


 


Output:    


 


  Urine   920 











Oxygen Devices in Use Now: None


Neurology Exam: 


General: 





Well nourished, well developed, and in no acute distress


HEENT: Normocephelic/atraumatic, sclera anicteric, mucous membranes moist


Neck: Supple


Chest: Clear to auscultation bilaterally 


Cardiovascular: Regular rate and rhythm without murmurs, rubs, gallops


Extremities: No clubbing, cyanosis, or edema








Neurological Findings: 





Awake, alert, and oriented to person, place, and time. Mild intellectual 

disability. 


Speech: fluent without dysarthria, repetition intact


Cranial Nerve: PERRL, EOM intact, VFF, no nystagmus, face symmetric.  No facial 

asymmetry. 


Motor: s/s throughout, proximal and distal extremities x4 tone/bulk normal


Sensation: intact to LT/PP bilaterally upper and lower extremities


Deep Tendon Reflex: 2+ symmetric in the upper/lower extremities, Babinski - 

down going


Finger to nose, rapid alternating movements intact without tremor, no 

dysdiadochokinesia


Gait: intact with good arm swing and stride











Result Diagrams: 


 12/11/19 05:10





 12/11/19 05:10


Microbiology and Other Data: 


 Microbiology











 12/10/19 17:20 Nasal Screen MRSA (PCR) - Final





 Nasal    Mrsa Not Detected














Assessment/Plan





Assessment:


Mr. Finney is a 29-year-old man with history of testicular cancer with 

retroperitoneal adenopathy s/p orchiectomy and chemotherapy who presented with 

sudden onset aphasia, dysarthria, and right hemiparesthesia.  





The symptoms resolved while he received IV alteplase therapy and during the CTA 

head last night.  





1. Suspect left MCA vascular territory TIA or small subcortical infarction.   S/

p IV alteplase therapy.  Risk factor for cerebrovascular disease include 

hypercoagulable state of malignancy and hypertension. Other differential 

diagnosis include complicated headache or seizures are less likely since he has 

no history of either conditions in the past. 





NIHSS: 0 





2. Pancytopenia most likely due to chemotherapy 





Recommendations:


- MRI brain without contrast (as the study cannot be done within 24 hours of 

the IV alteplase).  Since the patient is asymptomatic, we don't suspect he has 

metastatic disease.  Plus the CTA was negative for any enhancing lesions.   

Therefore, we did not order the MRI with contrast.  


- Obtain an EEG to evaluate for epileptiform abnormalities. 


-Check a 2-D TTE with bubble study to evaluate for PFO


- Antiplatelets: due to thrombocytopenia, recommend holding aspirin 81 mg until 

his platelet function improves to above 100K. DAPT may increase his risk of 

bleeding which outweigh the benefit. 


- Anticoagulation therapy- not indicated at this time.  


- Atorvastatin 40 mg nightly. 


- BP goal: normotensive 


- No need for PT/OT/SLP consultation since the patient is asymptomatic without 

any ataxia, fine motor deficits, or swallowing difficulty.   


- TIA/stroke education completed 


- VTE prophylaxis- SCDs





Follow-up with hematology for pancytopenia. 





If the repeat imaging is negative for hemorrhage, I recommend discharge to home 

once the patient is ready. 





Neurology will continue to follow.

## 2019-12-11 NOTE — EEG
ELECTROENCEPHALOGRAPHY:

 

DATE OF STUDY:  12/11/19 - ROOM #ICU-04

 

DATE ORDERED:  12/11/19

 

ORDERING PROVIDER:  Carly Albert MD

 

CLINICAL PROBLEM:  Mr. Finney is a 29-year-old man with an episode of right 
facial numbness and word finding difficulty.  This EEG was obtained to evaluate 
for epileptiform abnormalities or electrographic seizures.

 

MEDICATIONS:

1.  Lipitor.

2.  Rocephin.

3.  Ventolin.

4.  Dulera.

5.  Zyprexa.

6.  Cardene.

 

CLINICAL STATE:  Awake and sleep.

 

REPORT:  The waking background showed appropriate organization with clearly 
defined anterior-posterior voltage and frequency gradients.  There was a well-
defined posterior dominant rhythm of 10 Hz, which was symmetrical and showed 
normal reactivity.  The posterior dominant rhythm showed significant low 
amplitude. Anteriorly, there was an expected pattern of lower voltage, 
irregular mixed faster frequency.  Attenuation of the occipital rhythm 
accompanied drowsiness.  The sleep background was appropriately organized with 
a well-developed spindles and vertex waves.  These sleep transients showed 
appropriate morphology and are bilaterally synchronous and symmetrical.

 

There were rare diffuse, but predominantly left temporal and right 
temporoparietal spike wave that was associated with head movements.  I do not 
suspect this as an ictal abnormality.

 

EKG, normal sinus rhythm with a rate of 95 beats per minute. 



CLINICAL IMPRESSION:  This is a normal awake and sleep EEG.  There were no 
focal or epileptiform abnormalities.  Normal interictal EEG does not exclude or 
support the diagnosis of epilepsy.  Clinical correlation is recommended.

 

[id number] #: [id number]

MTDD

## 2019-12-11 NOTE — DS
Admitted with acute neurological deficit. Treated with TPA for possible stroke. 

Deficits resolved. no complications. MRI does not reveal clear evidence of 

stroke per Neurology. Diagnosis is TIA. Discharge back to [facility] with ASA 

81mg daily and Lipitor 40mg daily as well as his usual prescriptions. F/U PCP 4-

7 days and Neurology 1 month.

## 2019-12-11 NOTE — ECHO
*[facility]*

[place] [address]

Phone: [phone]

Fax #: [phone]



-------------------------------------------------------------------

Transthoracic Echocardiogram



Patient: Marquis Dillon              MRN:        [id number]

:     1990                         Study Date: 2019

Age:     29                                 Accession#: [id number]

Gender:  M                                  HR:         91 bpm

Height:  64 in /162.6 cm                    BSA:        2.13 m^2

Weight:  211.6 lb /96.2 kg                  BMI:        36.4 kg/m^2



*Sonographer: * Kailee Mansfield [facility]

 

*Referring Physician: * Diane Boykin

*Reading Physician: * Karlie Loza MD



-------------------------------------------------------------------

Indications:   CVA.



-------------------------------------------------------------------

History:   Risk factors:  Hypertension.



-------------------------------------------------------------------

Conclusions



Summary:



- Left ventricle: The cavity size is at the lower limits of normal.

  Wall thickness is mildly increased. Systolic function is normal.

  The estimated ejection fraction is 60-65%. Left ventricular

  diastolic function parameters are normal.

- Right ventricle: The cavity size is mildly dilated. Systolic

  function is normal.

- Atrial septum: A PFO is not demonstrated by color Doppler or

  agitated saline contrast.

- Mitral valve: There is trace regurgitation.

- Pulmonary arteries: Systolic pressure can not be accurately

  estimated.

- No prior echocardiogram to compare.



-------------------------------------------------------------------

Study data:  Transthoracic echocardiogram.  Procedure:

Transthoracic echocardiography was performed. Image quality was

fair. A bubble study was performed.  Complete 2D, spectral Doppler,

and color flow Doppler.  Location:  ICU  Patient status:

Inpatient. Patient room number: ICU-04.  Rhythm:  Normal sinus

rhythm.



-------------------------------------------------------------------

Findings



Left ventricle:  The cavity size is at the lower limits of normal.

Wall thickness is mildly increased. Systolic function is normal.

The estimated ejection fraction is 60-65%. Wall motion is normal;

there are no regional wall motion abnormalities. Left ventricular

diastolic function parameters are normal.

Right ventricle:  The cavity size is mildly dilated. Systolic

function is normal.

Left atrium:  The atrium is normal in size.

Right atrium:  The atrium is normal in size.

Atrial septum:  A PFO is not demonstrated by color Doppler or

agitated saline contrast. Negative bubble study images 22 ansd 23.

Mitral valve:  The leaflets are mildly thickened.  There is no

evidence of stenosis.   There is trace regurgitation.

Aortic valve:   The valve is trileaflet. The leaflets are normal

thickness.  There is no evidence of stenosis.   There is no

significant regurgitation.

Tricuspid valve:  The leaflets are normal thickness.  There is no

evidence of stenosis.   There is physiologic regurgitation.

Pulmonic valve:   The leaflets are normal thickness.  There is no

evidence of stenosis.   There is trace regurgitation.

Aorta:  Aortic root: The aortic root is appears normal.

Ascending aorta: The ascending aorta is appears normal.

Aortic arch: The aortic arch is appears normal.

Pericardium:  There is no significant pericardial effusion.

Pulmonary arteries:

The main pulmonary artery is normal-sized.  Systolic pressure can

not be accurately estimated.

Systemic veins:

Inferior vena cava: The vessel is normal in size. There is (&gt;= 50%)

respiratory change in the IVC dimension.



-------------------------------------------------------------------

Measurements



 Left ventricle              Value        Ref       Aortic valve               Value       Ref

 JASPER, LAX           (L)      3.9   cm     4.2 -     Ann-Marie diam, S                2.0   cm    2.0 - 3.2

                                          5.8       Peak v, S                  1.26  m/sec ---------

 ESD, LAX           (L)      2.4   cm     2.5 -     VTI, S                     23.2  cm    ---------

                                          4.0       Mean grad, S               4.0   mm Hg ---------

 FS, LAX                     39    %      25 - 43   Peak grad, S               6.3   mm Hg ---------

 PW, ED, LAX        (H)      1.1   cm     0.6 -     LVOT/AV, VTI ratio         0.87        ---------

                                          1.0

 FS                          39    %      25 - 43   Mitral valve               Value       Ref

 PW, ED             (H)      1.1   cm     0.6 -     Peak E                     0.66  m/sec ---------

                                          1.0       Peak A                     0.58  m/sec ---------

 E&apos;, lat ann-marie, TDI   (L)      9.7   cm/sec &gt;=10.0    Decel time                 182   ms    --
-------

 E/e&apos;, lat ann-marie, TDI          7            --------  Peak E/A ratio             1.14        -----
----

 

 LVOT                        Value        Ref       Pulmonic valve             Value       Ref

 Peak kadie, S                 1.21  m/sec  --------  Peak v, S                  1.14  m/sec ---------

 VTI, S                      20.1  cm     --------  Peak grad, S               5.2   mm Hg ---------

 Peak grad, S                6     mm Hg  --------

 Mean grad, S                3     mm Hg  --------  Aortic root                Value       Ref

                                                    Root diam                  2.6   cm    &lt;3.8

 Ventricular septum          Value        Ref

 IVS, ED            (H)      1.1   cm     0.6 -     Ascending aorta            Value       Ref

                                          1.0       AAo AP diam, S             2.5   cm    ---------

 

 Right ventricle             Value        Ref       Aortic arch                Value       Ref

 JASPER, LAX                    2.4   cm     --------  Arch diam                  2.1   cm    ---------

 JASPER minor ax, A4C  (H)      4.1   cm     1.9 -

 mid                                      3.5       Decending aorta            Value       Ref

                                                    Steven peak kadie               1.3   m/sec ---------

 Left atrium                 Value        Ref

 LA ID                       3.5   cm     --------  Inferior vena cava         Value       Ref

 SI dim ES, LAX              3.5   cm     --------  Diam                       1.4   cm    ---------

 ML dim, A4C                 3.7   cm     --------

 SI dim, A4C                 4.0   cm     --------

 Vol, ES, 2-p                37    ml     --------

 Vol/bsa, ES, 2-p            17    ml/m^2 16 - 34

 

 Right atrium                Value        Ref

 SI dim, ES                  4.4   cm     3.4 -

                                          5.3

 ML dim, ES, A4C             4.2   cm     2.6 -

                                          4.4

 

Legend:

(L)  and  (H)  fede values outside specified reference range.



Prepared and electronically signed by



Karlie Loza MD

2019 12:09